# Patient Record
Sex: MALE | Race: WHITE | NOT HISPANIC OR LATINO | Employment: UNEMPLOYED
[De-identification: names, ages, dates, MRNs, and addresses within clinical notes are randomized per-mention and may not be internally consistent; named-entity substitution may affect disease eponyms.]

---

## 2017-09-19 PROBLEM — F32.9 MAJOR DEPRESSIVE DISORDER: Status: ACTIVE | Noted: 2017-09-19

## 2017-09-19 PROBLEM — F41.9 ANXIETY: Status: ACTIVE | Noted: 2017-09-19

## 2018-09-11 ENCOUNTER — HOSPITAL (OUTPATIENT)
Dept: OTHER | Age: 38
End: 2018-09-11
Attending: HOSPITALIST

## 2018-09-11 LAB
25(OH)D3+25(OH)D2 SERPL-MCNC: 31.7 NG/ML (ref 30–100)
ALBUMIN SERPL-MCNC: 4.4 GM/DL (ref 3.6–5.1)
ALBUMIN SERPL-MCNC: 6.7 GM/DL (ref 3.6–5.1)
ALBUMIN/GLOB SERPL: 1.4 {RATIO} (ref 1–2.4)
ALBUMIN/GLOB SERPL: 1.6 {RATIO} (ref 1–2.4)
ALP SERPL-CCNC: 49 UNIT/L (ref 45–117)
ALP SERPL-CCNC: 75 UNIT/L (ref 45–117)
ALT SERPL-CCNC: 41 UNIT/L
ALT SERPL-CCNC: 58 UNIT/L
AMORPH SED URNS QL MICRO: ABNORMAL
AMPHETAMINES UR QL SCN>500 NG/ML: NEGATIVE
ANALYZER ANC (IANC): ABNORMAL
ANALYZER ANC (IANC): ABNORMAL
ANION GAP SERPL CALC-SCNC: 21 MMOL/L (ref 10–20)
ANION GAP SERPL CALC-SCNC: 27 MMOL/L (ref 10–20)
APAP SERPL-MCNC: <2 MCG/ML (ref 10–30)
APPEARANCE UR: ABNORMAL
APTT PPP: 29 SECONDS (ref 22–30)
APTT PPP: NORMAL S
AST SERPL-CCNC: 32 UNIT/L
AST SERPL-CCNC: 47 UNIT/L
BACTERIA #/AREA URNS HPF: ABNORMAL /HPF
BARBITURATES UR QL SCN>200 NG/ML: NEGATIVE
BASE DEFICIT BLDA-SCNC: 7 MMOL/L (ref 0–2)
BASE EXCESS BLDA CALC-SCNC: ABNORMAL MMOL/L
BASOPHILS # BLD: 0 THOUSAND/MCL (ref 0–0.3)
BASOPHILS # BLD: 0 THOUSAND/MCL (ref 0–0.3)
BASOPHILS NFR BLD: 0 %
BASOPHILS NFR BLD: 0 %
BDY SITE: ABNORMAL
BENZODIAZ UR QL SCN>200 NG/ML: NEGATIVE
BILIRUB SERPL-MCNC: 0.6 MG/DL (ref 0.2–1)
BILIRUB SERPL-MCNC: 1 MG/DL (ref 0.2–1)
BILIRUB UR QL: NEGATIVE
BODY TEMPERATURE: 37 DEGREES
BUN SERPL-MCNC: 34 MG/DL (ref 6–20)
BUN SERPL-MCNC: 36 MG/DL (ref 6–20)
BUN/CREAT SERPL: 14 (ref 7–25)
BUN/CREAT SERPL: 9 (ref 7–25)
BZE UR QL SCN>150 NG/ML: NEGATIVE
CA-I BLDA-SCNC: 23 % (ref 15–23)
CALCIUM SERPL-MCNC: 11.7 MG/DL (ref 8.4–10.2)
CALCIUM SERPL-MCNC: 8.5 MG/DL (ref 8.4–10.2)
CANNABINOIDS UR QL SCN>50 NG/ML: POSITIVE
CAOX CRY URNS QL MICRO: ABNORMAL
CHLORIDE: 102 MMOL/L (ref 98–107)
CHLORIDE: 92 MMOL/L (ref 98–107)
CK SERPL-CCNC: 834 UNIT/L (ref 39–308)
CO2 SERPL-SCNC: 18 MMOL/L (ref 21–32)
CO2 SERPL-SCNC: 19 MMOL/L (ref 21–32)
COHGB MFR BLD: 1 %
COLOR UR: ABNORMAL
CONDITION: ABNORMAL
CONDITION: ABNORMAL
CREAT SERPL-MCNC: 2.5 MG/DL (ref 0.67–1.17)
CREAT SERPL-MCNC: 4.03 MG/DL (ref 0.67–1.17)
DIFFERENTIAL METHOD BLD: ABNORMAL
DIFFERENTIAL METHOD BLD: ABNORMAL
EOSINOPHIL # BLD: 0 THOUSAND/MCL (ref 0.1–0.5)
EOSINOPHIL # BLD: 0 THOUSAND/MCL (ref 0.1–0.5)
EOSINOPHIL NFR BLD: 0 %
EOSINOPHIL NFR BLD: 0 %
EPITH CASTS #/AREA URNS LPF: ABNORMAL /[LPF]
ERYTHROCYTE [DISTWIDTH] IN BLOOD: 12.1 % (ref 11–15)
ERYTHROCYTE [DISTWIDTH] IN BLOOD: 12.2 % (ref 11–15)
ETHANOL SERPL-MCNC: NORMAL MG/DL
FATTY CASTS #/AREA URNS LPF: ABNORMAL /[LPF]
GLOBULIN SER-MCNC: 3.2 GM/DL (ref 2–4)
GLOBULIN SER-MCNC: 4.2 GM/DL (ref 2–4)
GLUCOSE BLDC GLUCOMTR-MCNC: 74 MG/DL (ref 65–99)
GLUCOSE SERPL-MCNC: 111 MG/DL (ref 65–99)
GLUCOSE SERPL-MCNC: 121 MG/DL (ref 65–99)
GLUCOSE UR-MCNC: 150 MG/DL
GRAN CASTS #/AREA URNS LPF: ABNORMAL /[LPF]
HCO3 BLDA-SCNC: 18 MMOL/L (ref 22–28)
HCT VFR BLD CALC: 51 % (ref 39–51)
HEMATOCRIT: 43.1 % (ref 39–51)
HEMATOCRIT: 52.6 % (ref 39–51)
HGB BLD-MCNC: 15.2 GM/DL (ref 13–17)
HGB BLD-MCNC: 17.1 GM/DL (ref 13–17)
HGB BLD-MCNC: 19.2 GM/DL (ref 13–17)
HGB UR QL: ABNORMAL
HOROWITZ INDEX BLD+IHG-RTO: ABNORMAL MM[HG]
HYALINE CASTS #/AREA URNS LPF: ABNORMAL /LPF (ref 0–5)
IGA SERPL-MCNC: 364 MG/DL (ref 82–453)
IGG SERPL-MCNC: 1080 MG/DL (ref 751–1560)
IGM SERPL-MCNC: 81 MG/DL (ref 46–304)
INR PPP: 1.1
KAPPA LC FREE SER NEPH-MCNC: 1.27 MG/DL (ref 0.33–1.94)
KAPPA LC/LAMBDA SER: 0.93 {RATIO} (ref 0.26–1.65)
KETONES UR-MCNC: NEGATIVE MG/DL
LACTATE BLDV-MCNC: 1.1 MMOL/L
LACTATE BLDV-MCNC: 2 MMOL/L
LAMBDA LC FREE SER NEPH-MCNC: 1.36 MG/DL (ref 0.57–2.63)
LEUKOCYTE ESTERASE UR QL STRIP: NEGATIVE
LIPASE SERPL-CCNC: 161 UNIT/L (ref 73–393)
LYMPHOCYTES # BLD: 1.5 THOUSAND/MCL (ref 1–4.8)
LYMPHOCYTES # BLD: 2 THOUSAND/MCL (ref 1–4.8)
LYMPHOCYTES NFR BLD: 10 %
LYMPHOCYTES NFR BLD: 12 %
M PROTEIN 1 SERPL ELPH-MCNC: NORMAL GM/DL
M PROTEIN 2 SERPL ELPH-MCNC: NORMAL GM/DL
MAGNESIUM SERPL-MCNC: 2.6 MG/DL (ref 1.7–2.4)
MCH RBC QN AUTO: 32.6 PG (ref 26–34)
MCH RBC QN AUTO: 33.4 PG (ref 26–34)
MCHC RBC AUTO-ENTMCNC: 35.3 GM/DL (ref 32–36.5)
MCHC RBC AUTO-ENTMCNC: 36.5 GM/DL (ref 32–36.5)
MCV RBC AUTO: 91.6 FL (ref 78–100)
MCV RBC AUTO: 92.5 FL (ref 78–100)
METHGB MFR BLD: 0.7 %
MIXED CELL CASTS #/AREA URNS LPF: ABNORMAL /[LPF]
MONOCYTES # BLD: 1.1 THOUSAND/MCL (ref 0.3–0.9)
MONOCYTES # BLD: 1.5 THOUSAND/MCL (ref 0.3–0.9)
MONOCYTES NFR BLD: 8 %
MONOCYTES NFR BLD: 8 %
MUCOUS THREADS URNS QL MICRO: PRESENT
NEUTROPHILS # BLD: 10.7 THOUSAND/MCL (ref 1.8–7.7)
NEUTROPHILS # BLD: 15.6 THOUSAND/MCL (ref 1.8–7.7)
NEUTROPHILS NFR BLD: 80 %
NEUTROPHILS NFR BLD: 82 %
NEUTS SEG NFR BLD: ABNORMAL %
NEUTS SEG NFR BLD: ABNORMAL %
NITRITE UR QL: NEGATIVE
NRBC (NRBCRE): ABNORMAL
NRBC (NRBCRE): ABNORMAL
OPIATES UR QL SCN>300 NG/ML: NEGATIVE
ORGANIC ACIDS/CREAT UR-SRTO: 358.42 MG/DL
ORGANIC ACIDS/CREAT UR-SRTO: 358.42 MG/DL
OSMOLALITY SERPL: 296 MOSM/KG (ref 275–300)
OSMOLALITY UR: 364 MOSM/KG (ref 50–1200)
OXYHGB MFR BLD: 94.5 % (ref 94–98)
PAO2 / FIO2 RATIO (RPFR): ABNORMAL
PATH INTERP BLD-IMP: NORMAL
PATH INTERP SPEC-IMP: NORMAL
PCO2 BLDA: 31 MM HG (ref 35–48)
PCP UR QL SCN>25 NG/ML: NEGATIVE
PH BLDA: 7.36 UNIT (ref 7.35–7.45)
PH UR: 5 UNIT (ref 5–7)
PHOSPHATE SERPL-MCNC: 2.8 MG/DL (ref 2.4–4.7)
PLATELET # BLD: 285 THOUSAND/MCL (ref 140–450)
PLATELET # BLD: 381 THOUSAND/MCL (ref 140–450)
PO2 BLDA: 84 MM HG (ref 83–108)
POTASSIUM SERPL-SCNC: 4.1 MMOL/L (ref 3.4–5.1)
POTASSIUM SERPL-SCNC: 5.4 MMOL/L (ref 3.4–5.1)
PROT SERPL-MCNC: 10.9 GM/DL (ref 6.4–8.2)
PROT SERPL-MCNC: 7.6 GM/DL (ref 6.4–8.2)
PROT SERPL-MCNC: 7.9 GM/DL (ref 6.4–8.2)
PROT UR QL: 100 MG/DL
PROT UR-MCNC: 387 MG/DL
PROT/CREAT UR: 1080 MG/GM CREAT
PROTHROMBIN TIME: 11.1 SECONDS (ref 9.7–11.8)
PROTHROMBIN TIME: NORMAL
PTH-INTACT SERPL-MCNC: 22 PG/ML (ref 19–88)
RBC # BLD: 4.66 MILLION/MCL (ref 4.5–5.9)
RBC # BLD: 5.74 MILLION/MCL (ref 4.5–5.9)
RBC #/AREA URNS HPF: ABNORMAL /HPF (ref 0–3)
RBC CASTS #/AREA URNS LPF: ABNORMAL /[LPF]
RENAL EPI CELLS #/AREA URNS HPF: ABNORMAL /[HPF]
SALICYLATES SERPL-MCNC: <2.8 MG/DL
SAO2 % BLDA: 96 % (ref 95–99)
SODIUM SERPL-SCNC: 132 MMOL/L (ref 135–145)
SODIUM SERPL-SCNC: 138 MMOL/L (ref 135–145)
SODIUM UR-SCNC: 7 MMOL/L
SP GR UR: 1.01 (ref 1–1.03)
SPECIMEN SOURCE: ABNORMAL
SPERM URNS QL MICRO: PRESENT
SQUAMOUS #/AREA URNS HPF: ABNORMAL /HPF (ref 0–5)
T VAGINALIS URNS QL MICRO: ABNORMAL
TRI-PHOS CRY URNS QL MICRO: ABNORMAL
TROPONIN I SERPL HS-MCNC: <0.02 NG/ML
URATE CRY URNS QL MICRO: ABNORMAL
URINE REFLEX: ABNORMAL
URNS CMNT MICRO: ABNORMAL
UROBILINOGEN UR QL: 0.2 MG/DL (ref 0–1)
WAXY CASTS #/AREA URNS LPF: ABNORMAL /[LPF]
WBC # BLD: 13.4 THOUSAND/MCL (ref 4.2–11)
WBC # BLD: 19.1 THOUSAND/MCL (ref 4.2–11)
WBC #/AREA URNS HPF: ABNORMAL /HPF (ref 0–5)
WBC CASTS #/AREA URNS LPF: ABNORMAL /[LPF]
YEAST HYPHAE URNS QL MICRO: ABNORMAL
YEAST URNS QL MICRO: ABNORMAL

## 2018-09-12 ENCOUNTER — DIAGNOSTIC TRANS (OUTPATIENT)
Dept: OTHER | Age: 38
End: 2018-09-12

## 2018-09-12 LAB
ALBUMIN SERPL-MCNC: 4 GM/DL (ref 3.6–5.1)
ALBUMIN/GLOB SERPL: 1.4 {RATIO} (ref 1–2.4)
ALP SERPL-CCNC: 53 UNIT/L (ref 45–117)
ALT SERPL-CCNC: 38 UNIT/L
ANALYZER ANC (IANC): ABNORMAL
ANION GAP SERPL CALC-SCNC: 14 MMOL/L (ref 10–20)
AST SERPL-CCNC: 27 UNIT/L
BASOPHILS # BLD: 0 THOUSAND/MCL (ref 0–0.3)
BASOPHILS NFR BLD: 0 %
BILIRUB SERPL-MCNC: 0.4 MG/DL (ref 0.2–1)
BUN SERPL-MCNC: 33 MG/DL (ref 6–20)
BUN/CREAT SERPL: 17 (ref 7–25)
CALCIUM SERPL-MCNC: 8.5 MG/DL (ref 8.4–10.2)
CHLORIDE: 102 MMOL/L (ref 98–107)
CO2 SERPL-SCNC: 24 MMOL/L (ref 21–32)
CREAT SERPL-MCNC: 1.92 MG/DL (ref 0.67–1.17)
DIFFERENTIAL METHOD BLD: ABNORMAL
EOSINOPHIL # BLD: 0.1 THOUSAND/MCL (ref 0.1–0.5)
EOSINOPHIL NFR BLD: 1 %
ERYTHROCYTE [DISTWIDTH] IN BLOOD: 12.4 % (ref 11–15)
GLOBULIN SER-MCNC: 2.8 GM/DL (ref 2–4)
GLUCOSE SERPL-MCNC: 101 MG/DL (ref 65–99)
HEMATOCRIT: 40.6 % (ref 39–51)
HGB BLD-MCNC: 13.8 GM/DL (ref 13–17)
LYMPHOCYTES # BLD: 2.9 THOUSAND/MCL (ref 1–4.8)
LYMPHOCYTES NFR BLD: 31 %
MCH RBC QN AUTO: 31.7 PG (ref 26–34)
MCHC RBC AUTO-ENTMCNC: 34 GM/DL (ref 32–36.5)
MCV RBC AUTO: 93.1 FL (ref 78–100)
MONOCYTES # BLD: 1.1 THOUSAND/MCL (ref 0.3–0.9)
MONOCYTES NFR BLD: 12 %
NEUTROPHILS # BLD: 5.4 THOUSAND/MCL (ref 1.8–7.7)
NEUTROPHILS NFR BLD: 56 %
NEUTS SEG NFR BLD: ABNORMAL %
NRBC (NRBCRE): ABNORMAL
PLATELET # BLD: 279 THOUSAND/MCL (ref 140–450)
POTASSIUM SERPL-SCNC: 3.7 MMOL/L (ref 3.4–5.1)
PROT SERPL-MCNC: 6.8 GM/DL (ref 6.4–8.2)
RBC # BLD: 4.36 MILLION/MCL (ref 4.5–5.9)
SODIUM SERPL-SCNC: 136 MMOL/L (ref 135–145)
WBC # BLD: 9.6 THOUSAND/MCL (ref 4.2–11)

## 2018-09-13 LAB
ANION GAP SERPL CALC-SCNC: 9 MMOL/L (ref 10–20)
BUN SERPL-MCNC: 16 MG/DL (ref 6–20)
BUN/CREAT SERPL: 17 (ref 7–25)
CALCIUM SERPL-MCNC: 8.3 MG/DL (ref 8.4–10.2)
CHLORIDE: 105 MMOL/L (ref 98–107)
CO2 SERPL-SCNC: 28 MMOL/L (ref 21–32)
CREAT SERPL-MCNC: 0.92 MG/DL (ref 0.67–1.17)
GLUCOSE SERPL-MCNC: 94 MG/DL (ref 65–99)
GLYCOHEMOGLOBIN: 5.7 % (ref 4.5–5.6)
POTASSIUM SERPL-SCNC: 4.4 MMOL/L (ref 3.4–5.1)
SODIUM SERPL-SCNC: 138 MMOL/L (ref 135–145)
TSH SERPL-ACNC: 1.15 MCUNIT/ML (ref 0.35–5)

## 2018-09-21 PROCEDURE — 81003 URINALYSIS AUTO W/O SCOPE: CPT | Performed by: INTERNAL MEDICINE

## 2019-01-04 PROBLEM — M10.071 ACUTE IDIOPATHIC GOUT INVOLVING TOE OF RIGHT FOOT: Status: ACTIVE | Noted: 2019-01-04

## 2019-11-26 PROBLEM — M1A.09X0 IDIOPATHIC CHRONIC GOUT OF MULTIPLE SITES WITHOUT TOPHUS: Status: ACTIVE | Noted: 2019-11-26

## 2020-01-21 ENCOUNTER — WALK IN (OUTPATIENT)
Dept: URGENT CARE | Age: 40
End: 2020-01-21

## 2020-01-21 DIAGNOSIS — Z23 NEED FOR TDAP VACCINATION: Primary | ICD-10-CM

## 2020-01-21 PROCEDURE — 90715 TDAP VACCINE 7 YRS/> IM: CPT | Performed by: NURSE PRACTITIONER

## 2020-02-03 ENCOUNTER — HOSPITAL ENCOUNTER (OUTPATIENT)
Dept: GENERAL RADIOLOGY | Facility: HOSPITAL | Age: 40
Discharge: HOME | End: 2020-02-03
Attending: INTERNAL MEDICINE
Payer: MEDICAID

## 2020-02-03 ENCOUNTER — TELEPHONE (OUTPATIENT)
Dept: RHEUMATOLOGY | Facility: CLINIC | Age: 40
End: 2020-02-03

## 2020-02-03 ENCOUNTER — OFFICE VISIT (OUTPATIENT)
Dept: RHEUMATOLOGY | Facility: CLINIC | Age: 40
End: 2020-02-03
Payer: MEDICAID

## 2020-02-03 ENCOUNTER — APPOINTMENT (OUTPATIENT)
Dept: LAB | Facility: HOSPITAL | Age: 40
End: 2020-02-03
Attending: INTERNAL MEDICINE
Payer: MEDICAID

## 2020-02-03 VITALS
SYSTOLIC BLOOD PRESSURE: 125 MMHG | DIASTOLIC BLOOD PRESSURE: 80 MMHG | WEIGHT: 215 LBS | BODY MASS INDEX: 28.49 KG/M2 | HEART RATE: 73 BPM | HEIGHT: 73 IN

## 2020-02-03 DIAGNOSIS — M54.50 CHRONIC BILATERAL LOW BACK PAIN WITHOUT SCIATICA: ICD-10-CM

## 2020-02-03 DIAGNOSIS — M79.10 MYALGIA: ICD-10-CM

## 2020-02-03 DIAGNOSIS — G89.29 CHRONIC BILATERAL LOW BACK PAIN WITHOUT SCIATICA: ICD-10-CM

## 2020-02-03 DIAGNOSIS — M25.50 POLYARTHRALGIA: ICD-10-CM

## 2020-02-03 DIAGNOSIS — R53.83 FATIGUE, UNSPECIFIED TYPE: ICD-10-CM

## 2020-02-03 DIAGNOSIS — M25.50 POLYARTHRALGIA: Primary | ICD-10-CM

## 2020-02-03 LAB
CK SERPL-CCNC: 286 U/L (ref 39–308)
CRP SERPL-MCNC: 1.02 MG/DL (ref ?–0.3)
ERYTHROCYTE [SEDIMENTATION RATE] IN BLOOD: 14 MM/HR (ref 0–15)
HCV AB SERPL QL IA: NONREACTIVE
RHEUMATOID FACT SERPL-ACNC: <10 IU/ML (ref ?–15)

## 2020-02-03 PROCEDURE — 82550 ASSAY OF CK (CPK): CPT

## 2020-02-03 PROCEDURE — 72202 X-RAY EXAM SI JOINTS 3/> VWS: CPT | Performed by: INTERNAL MEDICINE

## 2020-02-03 PROCEDURE — 85652 RBC SED RATE AUTOMATED: CPT

## 2020-02-03 PROCEDURE — 86803 HEPATITIS C AB TEST: CPT

## 2020-02-03 PROCEDURE — 86140 C-REACTIVE PROTEIN: CPT

## 2020-02-03 PROCEDURE — 84402 ASSAY OF FREE TESTOSTERONE: CPT

## 2020-02-03 PROCEDURE — 84403 ASSAY OF TOTAL TESTOSTERONE: CPT

## 2020-02-03 PROCEDURE — 86200 CCP ANTIBODY: CPT

## 2020-02-03 PROCEDURE — 72100 X-RAY EXAM L-S SPINE 2/3 VWS: CPT | Performed by: INTERNAL MEDICINE

## 2020-02-03 PROCEDURE — 36415 COLL VENOUS BLD VENIPUNCTURE: CPT

## 2020-02-03 PROCEDURE — 86038 ANTINUCLEAR ANTIBODIES: CPT

## 2020-02-03 PROCEDURE — 86812 HLA TYPING A B OR C: CPT

## 2020-02-03 PROCEDURE — 86431 RHEUMATOID FACTOR QUANT: CPT

## 2020-02-03 PROCEDURE — 82085 ASSAY OF ALDOLASE: CPT

## 2020-02-03 PROCEDURE — 99244 OFF/OP CNSLTJ NEW/EST MOD 40: CPT | Performed by: INTERNAL MEDICINE

## 2020-02-03 RX ORDER — MELOXICAM 15 MG/1
15 TABLET ORAL DAILY
Qty: 30 TABLET | Refills: 0 | Status: SHIPPED | OUTPATIENT
Start: 2020-02-03 | End: 2020-03-02

## 2020-02-03 NOTE — PATIENT INSTRUCTIONS
1. Check labs   2. Check si joint xray and lumbar xray   3. Return to clinic in 2 weeks.    4. Cont. alloupurinol 100mga day

## 2020-02-03 NOTE — TELEPHONE ENCOUNTER
Patient is requesting a 2 week follow up appointment with Dr. Mary Richards at the [facility]. Next available appointment after 2 weeks is not until 03/02/2020, however, patient wants to be seen sooner. Please advise.

## 2020-02-03 NOTE — PROGRESS NOTES
Olvin Sorto is a 46year old male who presents for Patient presents with:  Consult  Joint Pain: back, neck,knees - evaluation for gout  Medication Question: Took prednisone in the past  .   HPI:     I had the pleasure of seeing Olvin Sorto on in pain all day every day. For 15 years his back has been hurting. He had an xray years ago. He thought it was from work. Never had physical therapy for his back. Never had injection in his back. He has no relieft with sitting and laying down.  Re Surgical History:   Procedure Laterality Date   • LASIK     • OTHER SURGICAL HISTORY      shoulder right repair   • SHOULDER ARTHROSCOPY        History reviewed. No pertinent family history. 1 brother , no fh of psoraisis.    Grandmother, 2 uncles and a rashes  CVS: RRR, no murmurs  RS: CTAB, no crackles, no rhonchi  ABD: Soft Non tender, no HSM felt, BS positive  Joint exam: achy in his neck and mid back and lower back   B/l knee not tender, but crepiuts b/l no synvoitis.    Not tender to touch in shoulde 1. 27   TSH      0.350 - 5.500 mIU/L 2.154     ASSESSMENT AND PLAN:   Cristian Fuentes is a 46year old male who presents for Patient presents with:  Consult  Joint Pain: back, neck,knees - evaluation for gout  Medication Question: Took prednisone in the

## 2020-02-04 LAB
ALDOLASE, SERUM: 6.2 U/L
HLA-B27: POSITIVE

## 2020-02-04 NOTE — TELEPHONE ENCOUNTER
Pt contacted and given follow up for 2/10/20 at 3:50 pm at [facility]. Lab contacted to check if labs in process will be complete by scheduled appointment; per lab they will be completed before appointment next Monday.

## 2020-02-04 NOTE — TELEPHONE ENCOUNTER
Ok to break up 2/26 slot - for follow up s, but I feel like we can put him on wait list and call if anyone drops off can have him schedule

## 2020-02-05 LAB — CCP IGG SERPL-ACNC: <0.4 U/ML (ref 0–6.9)

## 2020-02-06 LAB
NUCLEAR IGG TITR SER IF: NEGATIVE {TITER}
TESTOSTERONE, FREE, S: 111 NG/DL
TESTOSTERONE, TOTAL, S: 2850 NG/DL

## 2020-02-10 ENCOUNTER — OFFICE VISIT (OUTPATIENT)
Dept: RHEUMATOLOGY | Facility: CLINIC | Age: 40
End: 2020-02-10
Payer: MEDICAID

## 2020-02-10 VITALS
RESPIRATION RATE: 16 BRPM | SYSTOLIC BLOOD PRESSURE: 112 MMHG | DIASTOLIC BLOOD PRESSURE: 69 MMHG | BODY MASS INDEX: 28.49 KG/M2 | HEART RATE: 76 BPM | HEIGHT: 73 IN | WEIGHT: 215 LBS

## 2020-02-10 DIAGNOSIS — M54.89 INFLAMMATORY BACK PAIN: ICD-10-CM

## 2020-02-10 DIAGNOSIS — M54.50 CHRONIC BILATERAL LOW BACK PAIN WITHOUT SCIATICA: Primary | ICD-10-CM

## 2020-02-10 DIAGNOSIS — M79.10 MYALGIA: ICD-10-CM

## 2020-02-10 DIAGNOSIS — M25.50 POLYARTHRALGIA: ICD-10-CM

## 2020-02-10 DIAGNOSIS — G89.29 CHRONIC BILATERAL LOW BACK PAIN WITHOUT SCIATICA: Primary | ICD-10-CM

## 2020-02-10 PROCEDURE — 99214 OFFICE O/P EST MOD 30 MIN: CPT | Performed by: INTERNAL MEDICINE

## 2020-02-10 NOTE — PROGRESS NOTES
Megan Coker is a 46year old male who presents for Patient presents with: Follow - Up: Polyarthralgia, patient c/o pain of back, knees, neck and hands  Results  Medication Follow-Up  .    HPI:     I had the pleasure of seeing Megan Coker on 2 in pain all day every day. For 15 years his back has been hurting. He had an xray years ago. He thought it was from work. Never had physical therapy for his back. Never had injection in his back. He has no relieft with sitting and laying down.  Re Medication Sig Dispense Refill   • Meloxicam 15 MG Oral Tab Take 1 tablet (15 mg total) by mouth daily.  30 tablet 0   • ALLOPURINOL 100 MG Oral Tab TAKE 1 TABLET(100 MG) BY MOUTH DAILY 90 tablet 1   • OMEPRAZOLE 40 MG Oral Capsule Delayed Release TAKE ON stools,   : no dysuria, no hx of kidney stones, when he got dehyrated it would hurt to urinate, at [facility] he was told he migh thave kidney stones, never needed treatment,   Neuro: occl numbness or tingling, no headache, no hx of seizures,   Psych: no h Eosinophils %      % 4.3   Basophils %      % 0.3   Patient Fasting?        Yes   Glucose      70 - 99 mg/dL 95   BUN      8.0 - 20.0 mg/dL 11.0   CREATININE      0.70 - 1.30 mg/dL 0.99   Sodium      136 - 144 mmol/L 138   Potassium      3.60 - 5.10 mmol/ b/l knee xryas -   prednisoen may have helped him partially while he was on it. Plan for physical therapy    2. Right foot gout - stable on 1 year of allopurinol 100mg a day - in the past was on prednisone, colchicine and indocin    3.  Hx of dehyration a

## 2020-02-10 NOTE — PATIENT INSTRUCTIONS
1. Check MRI si joints - eval for ankylosing spondylitis   2. Check knees bilaterally -   3. Return to clinic in 2 weeks. 4. Cont. alloupurinol 100mga day   5. After MRI si joints - try sulfasalazine 500mg twice a day -   6. Cont. meloxicam 15mg a day . dose?  If you miss a dose, take it as soon as you can. If it is almost time for your next dose, take only that dose. Do not take double or extra doses. Where should I keep my medicine? Keep out of the reach of children.   Store at room temperature between and give this medicine. Use exactly as directed. Take your medicine at regular intervals. Do not take your medicine more often than directed. Carley Blizzard will be given to you by the pharmacist with each prescription and refill.  Be sure to read this medications:  · vaccines  What if I miss a dose? If you miss a dose, take it as soon as you can. If it is almost time for your next dose, take only that dose. Do not take double or extra doses. Give the next dose when your next scheduled dose is due.  Call this medicine. Do not treat yourself. This medicine may decrease your body's ability to fight infection. Talk to your doctor about your risk of cancer. You may be more at risk for certain types of cancers if you take this medicine.   NOTE:This sheet is a s

## 2020-02-14 ENCOUNTER — TELEPHONE (OUTPATIENT)
Dept: RHEUMATOLOGY | Facility: CLINIC | Age: 40
End: 2020-02-14

## 2020-02-14 NOTE — TELEPHONE ENCOUNTER
This number was not in service when tried calling twice - please send me another number to call for peer to peer -

## 2020-02-14 NOTE — TELEPHONE ENCOUNTER
Pt's ins has denied the MRI Pelvis. Spoke w/pt, and advised pt to call your office. If you would like a peer to peer, please call Vikki Davis @ [phone]. Thank you.

## 2020-02-17 NOTE — TELEPHONE ENCOUNTER
Referral tab updated. Contacted pt and updated of imaging PA approval. Pt is aware he may proceed with MRI as scheduled below.      Future Appointments   Date Time Provider Navya Petit   2/19/2020  4:00 PM [facility] MRI RM1 (1.5T WIDE) [facility] MRI EM [place]

## 2020-02-17 NOTE — TELEPHONE ENCOUNTER
[id number] review scheduled for today 2/17/2020 at 1pm CST with [address] [facility] reviewer Dr. Daren Pavon.     (Dr. Teena Arredondo direct cell # provided)

## 2020-02-29 ENCOUNTER — HOSPITAL ENCOUNTER (OUTPATIENT)
Dept: MRI IMAGING | Age: 40
Discharge: HOME | End: 2020-02-29
Attending: INTERNAL MEDICINE
Payer: MEDICAID

## 2020-02-29 ENCOUNTER — HOSPITAL ENCOUNTER (OUTPATIENT)
Dept: GENERAL RADIOLOGY | Age: 40
Discharge: HOME | End: 2020-02-29
Attending: INTERNAL MEDICINE
Payer: MEDICAID

## 2020-02-29 DIAGNOSIS — M25.50 PAIN IN JOINT, MULTIPLE SITES: ICD-10-CM

## 2020-02-29 DIAGNOSIS — M54.89 INFLAMMATORY BACK PAIN: ICD-10-CM

## 2020-02-29 DIAGNOSIS — G89.29 CHRONIC BILATERAL LOW BACK PAIN WITHOUT SCIATICA: ICD-10-CM

## 2020-02-29 DIAGNOSIS — M54.50 CHRONIC BILATERAL LOW BACK PAIN WITHOUT SCIATICA: ICD-10-CM

## 2020-02-29 PROCEDURE — 73562 X-RAY EXAM OF KNEE 3: CPT | Performed by: INTERNAL MEDICINE

## 2020-02-29 PROCEDURE — 72195 MRI PELVIS W/O DYE: CPT | Performed by: INTERNAL MEDICINE

## 2020-03-02 RX ORDER — MELOXICAM 15 MG/1
15 TABLET ORAL DAILY
Qty: 30 TABLET | Refills: 0 | Status: SHIPPED | OUTPATIENT
Start: 2020-03-02 | End: 2020-04-01

## 2020-03-02 NOTE — TELEPHONE ENCOUNTER
LOV: 2/21/2020  Future Appointments   Date Time Provider Navya Guillaumei   3/10/2020  4:10 PM Cynthia George MD [facility]   3/12/2020 12:20 PM Kristyn Thurman MD [facility] [place]   3/13/2020 10:40 AM Favian Hendricks MD [facility] N[facility]   3/1

## 2020-03-03 ENCOUNTER — TELEPHONE (OUTPATIENT)
Dept: RHEUMATOLOGY | Facility: CLINIC | Age: 40
End: 2020-03-03

## 2020-03-05 PROBLEM — H93.13 TINNITUS OF BOTH EARS: Status: ACTIVE | Noted: 2020-03-05

## 2020-03-12 ENCOUNTER — OFFICE VISIT (OUTPATIENT)
Dept: RHEUMATOLOGY | Facility: CLINIC | Age: 40
End: 2020-03-12
Payer: MEDICAID

## 2020-03-12 ENCOUNTER — TELEPHONE (OUTPATIENT)
Dept: RHEUMATOLOGY | Facility: CLINIC | Age: 40
End: 2020-03-12

## 2020-03-12 VITALS
WEIGHT: 219 LBS | HEART RATE: 71 BPM | SYSTOLIC BLOOD PRESSURE: 118 MMHG | HEIGHT: 73 IN | RESPIRATION RATE: 16 BRPM | DIASTOLIC BLOOD PRESSURE: 72 MMHG | BODY MASS INDEX: 29.03 KG/M2

## 2020-03-12 DIAGNOSIS — M45.9 ANKYLOSING SPONDYLITIS, UNSPECIFIED SITE OF SPINE (HCC): Primary | ICD-10-CM

## 2020-03-12 DIAGNOSIS — Z51.81 THERAPEUTIC DRUG MONITORING: ICD-10-CM

## 2020-03-12 PROCEDURE — 99214 OFFICE O/P EST MOD 30 MIN: CPT | Performed by: INTERNAL MEDICINE

## 2020-03-12 RX ORDER — SULFASALAZINE 500 MG/1
500 TABLET ORAL 2 TIMES DAILY
Qty: 60 TABLET | Refills: 1 | Status: SHIPPED | OUTPATIENT
Start: 2020-03-12 | End: 2020-04-01 | Stop reason: DRUGHIGH

## 2020-03-12 RX ORDER — HYDROCODONE BITARTRATE AND ACETAMINOPHEN 5; 325 MG/1; MG/1
TABLET ORAL
COMMUNITY
Start: 2020-02-17 | End: 2021-02-04 | Stop reason: ALTCHOICE

## 2020-03-12 RX ORDER — CYCLOBENZAPRINE HCL 5 MG
TABLET ORAL
Qty: 60 TABLET | Refills: 1 | Status: SHIPPED | OUTPATIENT
Start: 2020-03-12 | End: 2020-05-26

## 2020-03-12 RX ORDER — PREDNISONE 10 MG/1
TABLET ORAL
Qty: 21 TABLET | Refills: 0 | Status: SHIPPED | OUTPATIENT
Start: 2020-03-12 | End: 2020-09-18

## 2020-03-12 NOTE — PATIENT INSTRUCTIONS
1. Cont meloxicam 15mg a day - take with food   2. Plan for vnsoqi6z 40mg every other week    3. Cont. alloupurinol 100mga day   4. Plan to start humira after 1 month - to reduce risk of corona virus -   6. Try sulfalazine 500mg twice a day -   7.  Try pred continue or are bothersome):  · headache  · loss of appetite  · nausea, vomiting  · orange color to the urine  · reduced sperm count  What may interact with this medicine? · digoxin  · folic acid    What if I miss a dose?   If you miss a dose, take it as s

## 2020-03-12 NOTE — PROGRESS NOTES
Jesica Montes is a 46year old male who presents for Patient presents with:  Results: MRI  . HPI:     I had the pleasure of seeing Jescia Montes on 2/3/2020 for evaluation. He is a pleasant 46year old who was referred by Dr. Kevon Ontiveros.    He arriola He thought it was from work. Never had physical therapy for his back. Never had injection in his back. He has no relieft with sitting and laying down. Resting doesn't help. He has hurt his shoulder in the past at work. Had injections for that. prednisone for 8 months 2 years ago for gout  and then was dehydated severely . Wt Readings from Last 2 Encounters:  03/12/20 : 219 lb (99.3 kg)  02/21/20 : 219 lb (99.3 kg)    Body mass index is 28.89 kg/m².       Current Outpatient Medications   M jaw pain, no temple pain  Eyes: No visual changes,   CVS: No chest pain, no heart disease  RS: No SOB, no Cough, No Pleurtic pain,   GI: No nausea, no vomiiting, no abominal pain, no hx of ulcer, GERD - on omeprazoel since age 14,  heartburn, no dyshpagia, Eosinophils Absolute      0.00 - 0.30 10ˆ3/µL 0.27   Basophils Absolute      0.00 - 0.10 10ˆ3/µL 0.02   Neutrophils %      % 57.4   Lymphocytes %      % 27.0   Monocytes %      % 11.0   Eosinophils %      % 4.3   Basophils %      % 0.3   Patient Fasting? roots.     3. No acute osseous injury of the sacrococcygeal spine is apparent. 4. No suspicious osseous lesions are detected. 5. Lesser incidental findings as above.        2/29/2020 - b/l knee xray   normal  ASSESSMENT AND PLAN:   Meliton Collazo then off  8. Return to clinic in 3-4 weeks.        Luis Alejo MD  2/10/2020   4:28 PM  - [place]ed [place]- [place]  through Epic

## 2020-03-16 NOTE — TELEPHONE ENCOUNTER
Active 3/13/2020 Kiya [place] 7/31/1980 Humira CF Pending PA pending, we will submit once TB test reults are in. None.  [facility]

## 2020-03-25 NOTE — TELEPHONE ENCOUNTER
Active 3/13/2020 Hasmukh Rough 7/31/1980 Caitlin ESCOTO Pending PA pending, we will submit once TB test reults are in. None.  [facility]

## 2020-03-30 NOTE — TELEPHONE ENCOUNTER
Active 3/13/2020 Cami Guaman 7/31/1980 Humira CF Pending PA pending, we will submit once TB test reults are in. None.  [facility]

## 2020-04-01 ENCOUNTER — TELEPHONE (OUTPATIENT)
Dept: RHEUMATOLOGY | Facility: CLINIC | Age: 40
End: 2020-04-01

## 2020-04-01 ENCOUNTER — VIRTUAL PHONE E/M (OUTPATIENT)
Dept: RHEUMATOLOGY | Facility: CLINIC | Age: 40
End: 2020-04-01
Payer: MEDICAID

## 2020-04-01 DIAGNOSIS — Z51.81 THERAPEUTIC DRUG MONITORING: ICD-10-CM

## 2020-04-01 DIAGNOSIS — M45.9 ANKYLOSING SPONDYLITIS, UNSPECIFIED SITE OF SPINE (HCC): Primary | ICD-10-CM

## 2020-04-01 PROCEDURE — 99213 OFFICE O/P EST LOW 20 MIN: CPT | Performed by: INTERNAL MEDICINE

## 2020-04-01 RX ORDER — MELOXICAM 15 MG/1
15 TABLET ORAL DAILY
Qty: 90 TABLET | Refills: 0 | Status: SHIPPED | OUTPATIENT
Start: 2020-04-01 | End: 2020-06-26

## 2020-04-01 RX ORDER — SULFASALAZINE 500 MG/1
500 TABLET ORAL 3 TIMES DAILY
Qty: 270 TABLET | Refills: 0 | Status: SHIPPED | OUTPATIENT
Start: 2020-04-01 | End: 2020-04-06

## 2020-04-01 NOTE — TELEPHONE ENCOUNTER
Requested Prescriptions     Pending Prescriptions Disp Refills   • Meloxicam 15 MG Oral Tab 30 tablet 0     Sig: Take 1 tablet (15 mg total) by mouth daily.      Last filled:3/2/20 #30 tab w/ 0 rf   LOV: 3/12/20  Future Appointments   Date Time Provider Dep

## 2020-04-01 NOTE — PROGRESS NOTES
Virtual/Telephone Check-In    [facility] verbally consents to a Virtual/Telephone Check-In service on 04/01/20. Patient understands and accepts financial responsibility for any deductible, co-insurance and/or co-pays associated with this service. his knees and they area \"shot\". His back is insanely bad per pt. He can't hold his infant baby. He feels a burning sensation in his back with any motion - waist line to his mid back. He has had back issues for the last 15 years.    He is in pain all d knees, he has pain. He feels crunching of his knee caps. 3/12/2020  He has back back pain - 5/10 pain now  He gest sciatic a pain left more than right last month especially.    His mri si joint - shows left side mild edema of si joint   B/lknee xray - triamcinolone acetonide 0.1 % External Cream Apply topically 2 (two) times daily as needed.  (Patient not taking: Reported on 3/12/2020 ) 30 g 0   • ALLOPURINOL 100 MG Oral Tab TAKE 1 TABLET(100 MG) BY MOUTH DAILY 90 tablet 1   • OMEPRAZOLE 40 MG Oral Capsu loose stools,   : no dysuria, no hx of kidney stones, when he got dehyrated it would hurt to urinate, at [facility] he was told he migh thave kidney stones, never needed treatment,   Neuro: occl numbness or tingling, no headache, no hx of seizures,   Psych - 4 ng/mL 1.27   TSH      0.350 - 5.500 mIU/L 2.154     Component      Latest Ref Rng & Units 2/3/2020   Testosterone, Free, S      4.65 - 18.1 ng/dL 111 (H)   Testosterone, Total, S      240 - 950 ng/dL 2850 (H)   ALDOLASE, SERUM      1.5 - 8.1 U/L 6.2 - 6/4 at 2:50pm     prednisoen may have helped him partially while he was on it. Plan for physical therapy      2. Right foot gout - stable on 1 year of allopurinol 100mg a day - in the past was on prednisone, colchicine and indocin    3.  Hx of dehyratio

## 2020-04-06 ENCOUNTER — TELEPHONE (OUTPATIENT)
Dept: RHEUMATOLOGY | Facility: CLINIC | Age: 40
End: 2020-04-06

## 2020-04-06 DIAGNOSIS — Z51.81 THERAPEUTIC DRUG MONITORING: ICD-10-CM

## 2020-04-06 DIAGNOSIS — M45.9 ANKYLOSING SPONDYLITIS, UNSPECIFIED SITE OF SPINE (HCC): ICD-10-CM

## 2020-04-06 RX ORDER — SULFASALAZINE 500 MG/1
500 TABLET ORAL 3 TIMES DAILY
Qty: 270 TABLET | Refills: 0 | OUTPATIENT
Start: 2020-04-06 | End: 2020-09-23

## 2020-04-06 NOTE — TELEPHONE ENCOUNTER
Unable to reach pt. Left message informing pt medication is on back order at pharmacy - is there another pharmacy he would like to try? Ext [phone] today.

## 2020-04-06 NOTE — TELEPHONE ENCOUNTER
Nothing can be used to replace at this time ,   Can he get this at a different pharmacy? But currently let them fill when it's available.

## 2020-04-06 NOTE — TELEPHONE ENCOUNTER
Receive fax today from [facility], notifying medication sulfaSALAzine 500 MG Oral Tab is on backorder can something else be sent? Please fax with approval along with strength, direction, quantity, and refills. Please advise.     Last refilled: 4/1/20 #270 ta

## 2020-04-06 NOTE — TELEPHONE ENCOUNTER
Patient called back, attempted to reach clinic no answer. Patient states there is a [address] at [address], [place], [address] [facility]. Phone number is [phone].

## 2020-04-06 NOTE — TELEPHONE ENCOUNTER
Sulfasalazine called into [facility] in [facility]. Verbal given to pharmacist Fara Brooks for sulfasalazine 500mg #270 tablets with 0 refills as approved by provider on 4/1/2020:        SSZ script cancelled at [place] (technician Dirk Queen). Pt notified.  HEIDI -

## 2020-04-09 ENCOUNTER — TELEPHONE (OUTPATIENT)
Dept: RHEUMATOLOGY | Facility: CLINIC | Age: 40
End: 2020-04-09

## 2020-04-09 NOTE — TELEPHONE ENCOUNTER
Patient would like to set up a telephone visit with Dr. Sb Brock. States they are going over a few things in regards to an autoimmune disease he has. He would like to bring up a few issues he is having.

## 2020-04-10 ENCOUNTER — VIRTUAL PHONE E/M (OUTPATIENT)
Dept: RHEUMATOLOGY | Facility: CLINIC | Age: 40
End: 2020-04-10
Payer: MEDICAID

## 2020-04-10 DIAGNOSIS — R06.00 DOE (DYSPNEA ON EXERTION): Primary | ICD-10-CM

## 2020-04-10 DIAGNOSIS — M45.9 ANKYLOSING SPONDYLITIS, UNSPECIFIED SITE OF SPINE (HCC): ICD-10-CM

## 2020-04-10 PROCEDURE — 99212 OFFICE O/P EST SF 10 MIN: CPT | Performed by: INTERNAL MEDICINE

## 2020-04-10 NOTE — PROGRESS NOTES
Virtual Telephone Check-In    Ketty Bowen verbally consents to  a Virtual/Telephone Check-In visit on 04/10/20. Patient understands and accepts financial responsibility for any deductible, co-insurance and/or co-pays associated with this service. his knees and they area \"shot\". His back is insanely bad per pt. He can't hold his infant baby. He feels a burning sensation in his back with any motion - waist line to his mid back. He has had back issues for the last 15 years.    He is in pain all d knees, he has pain. He feels crunching of his knee caps. 3/12/2020  He has back back pain - 5/10 pain now  He gest sciatic a pain left more than right last month especially.    His mri si joint - shows left side mild edema of si joint   B/lknee xray - Current Outpatient Medications   Medication Sig Dispense Refill   • sulfaSALAzine 500 MG Oral Tab Take 1 tablet (500 mg total) by mouth 3 (three) times daily.  With meals 270 tablet 0   • Meloxicam 15 MG Oral Tab Take 1 tablet (15 mg total) by mouth daily , 132 week old daugther. Not working right now.    Studying now -      REVIEW OF SYSTEMS:   Review Of Systems:  Fatigue  Constitutional:No fever, no change in weight or appetitie  Derm: No rashes, no oral ulcers, no alopecia, no photosensitivit Patient Fasting?        Yes   Glucose      70 - 99 mg/dL 95   BUN      8.0 - 20.0 mg/dL 11.0   CREATININE      0.70 - 1.30 mg/dL 0.99   Sodium      136 - 144 mmol/L 138   Potassium      3.60 - 5.10 mmol/L 4.10   Chloride      101 - 111 mmol/L 102   Carbon Mt Levine is a 46year old male who presents for No chief complaint on file. 1. Ankloysing spondylitis - HLAB27 positive  -inflammatory - back pain ,  Polyarthralgia/myalgia nad long standing hx of gout.    MRI si joints - shows mild edema i

## 2020-05-26 RX ORDER — CYCLOBENZAPRINE HCL 5 MG
TABLET ORAL
Qty: 60 TABLET | Refills: 1 | Status: SHIPPED | OUTPATIENT
Start: 2020-05-26 | End: 2020-08-15

## 2020-05-26 NOTE — TELEPHONE ENCOUNTER
LOV: 4/10/20  Last Refilled:#60, 1rf  3/12/20    Future Appointments   Date Time Provider Navya Petit   5/27/2020  2:30 PM Enoch Duran MD [facility] [place]   6/1/2020  8:15 AM [facility] PFT ROOM [facility] RT  [facility]   6/2/2020  2:00 PM Teresa Redding

## 2020-05-27 ENCOUNTER — APPOINTMENT (OUTPATIENT)
Dept: LAB | Facility: HOSPITAL | Age: 40
End: 2020-05-27
Attending: INTERNAL MEDICINE
Payer: MEDICAID

## 2020-05-27 ENCOUNTER — OFFICE VISIT (OUTPATIENT)
Dept: GASTROENTEROLOGY | Facility: CLINIC | Age: 40
End: 2020-05-27
Payer: MEDICAID

## 2020-05-27 VITALS
WEIGHT: 209.81 LBS | DIASTOLIC BLOOD PRESSURE: 79 MMHG | HEIGHT: 73 IN | HEART RATE: 82 BPM | SYSTOLIC BLOOD PRESSURE: 133 MMHG | BODY MASS INDEX: 27.81 KG/M2

## 2020-05-27 DIAGNOSIS — M45.9 ANKYLOSING SPONDYLITIS, UNSPECIFIED SITE OF SPINE (HCC): ICD-10-CM

## 2020-05-27 DIAGNOSIS — Z51.81 THERAPEUTIC DRUG MONITORING: ICD-10-CM

## 2020-05-27 DIAGNOSIS — K21.9 GASTROESOPHAGEAL REFLUX DISEASE, ESOPHAGITIS PRESENCE NOT SPECIFIED: Primary | ICD-10-CM

## 2020-05-27 PROCEDURE — 99214 OFFICE O/P EST MOD 30 MIN: CPT | Performed by: INTERNAL MEDICINE

## 2020-05-27 PROCEDURE — 86803 HEPATITIS C AB TEST: CPT

## 2020-05-27 PROCEDURE — 86140 C-REACTIVE PROTEIN: CPT

## 2020-05-27 PROCEDURE — 36415 COLL VENOUS BLD VENIPUNCTURE: CPT

## 2020-05-27 PROCEDURE — 86704 HEP B CORE ANTIBODY TOTAL: CPT

## 2020-05-27 PROCEDURE — 85652 RBC SED RATE AUTOMATED: CPT

## 2020-05-27 PROCEDURE — 85027 COMPLETE CBC AUTOMATED: CPT

## 2020-05-27 PROCEDURE — 86480 TB TEST CELL IMMUN MEASURE: CPT

## 2020-05-27 PROCEDURE — 84460 ALANINE AMINO (ALT) (SGPT): CPT

## 2020-05-27 PROCEDURE — 82565 ASSAY OF CREATININE: CPT

## 2020-05-27 PROCEDURE — 87340 HEPATITIS B SURFACE AG IA: CPT

## 2020-05-27 PROCEDURE — 84450 TRANSFERASE (AST) (SGOT): CPT

## 2020-05-28 NOTE — PROGRESS NOTES
HPI:    Patient ID: Tiffany Triana is a 46year old male. HPI  The patient is self-referred. He presents for an additional opinion regarding chronic gastroesophageal reflux.   He has recently seen Dr. Rupa Sanchez and is scheduled for an upper endosc • HYDROcodone-acetaminophen 5-325 MG Oral Tab TK 1 T PO QHS PRN     • ALLOPURINOL 100 MG Oral Tab TAKE 1 TABLET(100 MG) BY MOUTH DAILY 90 tablet 1   • OMEPRAZOLE 40 MG Oral Capsule Delayed Release TAKE ONE CAPSULE BY MOUTH ONE TIME DAILY 90 capsule 3   • Latest Ref Rng & Units 5/27/2020 2/3/2020 11/26/2019   Patient Fasting?          Yes   Glucose      70 - 99 mg/dL   95   BUN      8.0 - 20.0 mg/dL   11.0   CREATININE      0.70 - 1.30 mg/dL   0.99   Sodium      136 - 144 mmol/L   138   Potassium      3.60 - 3.5 - 4.8 g/dL 4.3 4.0   Total Bilirubin      0.10 - 2.00 mg/dL 0.37 0.4   ALKALINE PHOSPHATASE      45 - 117 U/L 60 68   AST (SGOT)      15 - 37 U/L 22 35   ALT (SGPT)      16 - 61 U/L 42 47   GFR CKD-EPI      >=60.00 mL/min/1.73 m² 90.24    GFR      >=60 patient's history of ankylosing spondylitis is noted along with his family history of Crohn's disease. Inflammatory bowel disease cannot be excluded. Celiac sprue cannot be excluded.   Elective sprue serology should be obtained (especially in light of maegan

## 2020-05-29 DIAGNOSIS — Z01.812 PRE-PROCEDURAL LABORATORY EXAMINATION: Primary | ICD-10-CM

## 2020-05-31 ENCOUNTER — LAB SERVICES (OUTPATIENT)
Dept: LAB | Age: 40
End: 2020-05-31

## 2020-05-31 DIAGNOSIS — Z01.812 PRE-PROCEDURAL LABORATORY EXAMINATION: ICD-10-CM

## 2020-05-31 PROCEDURE — 87635 SARS-COV-2 COVID-19 AMP PRB: CPT | Performed by: INTERNAL MEDICINE

## 2020-06-01 ENCOUNTER — HOSPITAL ENCOUNTER (OUTPATIENT)
Dept: RESPIRATORY THERAPY | Facility: HOSPITAL | Age: 40
End: 2020-06-01
Attending: INTERNAL MEDICINE
Payer: MEDICAID

## 2020-06-01 LAB
SARS-COV-2 RNA SPEC QL NAA+PROBE: NOT DETECTED
SERVICE CMNT-IMP: NORMAL
SPECIMEN SOURCE: NORMAL

## 2020-06-02 ENCOUNTER — HOSPITAL ENCOUNTER (OUTPATIENT)
Dept: GASTROENTEROLOGY | Age: 40
Discharge: HOME | End: 2020-06-02
Attending: INTERNAL MEDICINE

## 2020-06-02 DIAGNOSIS — K21.9 GASTROESOPHAGEAL REFLUX DISEASE: ICD-10-CM

## 2020-06-02 RX ORDER — DEXTROSE AND SODIUM CHLORIDE 5; .45 G/100ML; G/100ML
INJECTION, SOLUTION INTRAVENOUS CONTINUOUS
Status: CANCELLED | OUTPATIENT
Start: 2020-06-02

## 2020-06-03 ENCOUNTER — TELEPHONE (OUTPATIENT)
Dept: RHEUMATOLOGY | Facility: CLINIC | Age: 40
End: 2020-06-03

## 2020-06-08 ENCOUNTER — HOSPITAL ENCOUNTER (OUTPATIENT)
Dept: RESPIRATORY THERAPY | Facility: HOSPITAL | Age: 40
Discharge: HOME | End: 2020-06-08
Attending: INTERNAL MEDICINE
Payer: MEDICAID

## 2020-06-08 NOTE — PFT
Patient was called Friday and Monday morning regarding the need for a COVID swab test prior to having Pulmonary Function test. Message was left on voice mail to give information and phone number to call to schedule these.

## 2020-06-11 ENCOUNTER — VIRTUAL PHONE E/M (OUTPATIENT)
Dept: RHEUMATOLOGY | Facility: CLINIC | Age: 40
End: 2020-06-11
Payer: MEDICAID

## 2020-06-11 ENCOUNTER — TELEPHONE (OUTPATIENT)
Dept: RHEUMATOLOGY | Facility: CLINIC | Age: 40
End: 2020-06-11

## 2020-06-11 DIAGNOSIS — M45.9 ANKYLOSING SPONDYLITIS, UNSPECIFIED SITE OF SPINE (HCC): Primary | ICD-10-CM

## 2020-06-11 DIAGNOSIS — Z51.81 THERAPEUTIC DRUG MONITORING: ICD-10-CM

## 2020-06-11 PROCEDURE — 99214 OFFICE O/P EST MOD 30 MIN: CPT | Performed by: INTERNAL MEDICINE

## 2020-06-11 NOTE — PATIENT INSTRUCTIONS
1.  Set up for PFTs - given his ongoing sob -/wilson - rule out as related changes   2. Stop sulfasalazine   3. Cont. alloupurinol 100mga day   4. Plan to start humira   5. Return to clinic in 1 month.     6.Cont meloxicam 15mg a day - take with food  Adalimum skin  · swelling of the ankles  · swollen lymph nodes in the neck, underarm, or groin areas  · unexplained weight loss  · unusual bleeding or bruising  · unusually weak or tired  Side effects that usually do not require medical attention (report to your do or preservatives  · pregnant or trying to get pregnant  · breast-feeding  What should I watch for while using this medicine? Visit your doctor or health care professional for regular checks on your progress.  Tell your doctor or healthcare professional if

## 2020-06-11 NOTE — PROGRESS NOTES
Virtual Telephone Check-In    Elsi Whalen verbally consents to  a Virtual/Telephone Check-In visit on 06/11/20. Patient has been referred to the [facility] website at [email or web address] to review the yearly Consent to Treat document.     Patient und working. In the last few years, his knees are more painful and he feels creaking in them. He has felt more pain in his knees and they area \"shot\". His back is insanely bad per pt. He can't hold his infant baby.  He feels a burning sensation in his the last two year. He had mri of his knees at [place] - and it returned \"as nothing. \"about 3-4 years. .   Even on his knees, he has pain. He feels crunching of his knee caps.        3/12/2020  He has back back pain - 5/10 pain now  He gest sciatic a pa better. Still in pain all the time. He didn't get the EGD. He was supposed to do PFTs but not able to do covid test prior to this. He feels sick on ssz - he is not better on sulfasalazine. His back and knee pain. Janna his back is really hurting.    He OTHER SURGICAL HISTORY      shoulder right repair   • SHOULDER ARTHROSCOPY     • UPPER GI ENDOSCOPY PERFORMED  ~2008      No family history on file. 1 brother , no fh of psoraisis.    Grandmother, 2 uncles and a cousin has crohn's,   Mom - has arthrits 1.5 - 8.1 U/L 6.2   CK      39 - 308 U/L 286   C-REACTIVE PROTEIN      <0.30 mg/dL 1.02 (H)   SED RATE      0 - 15 mm/Hr 14   RUSTY SCREEN WITH REFLEX (S)      Negative Negative   HCV AB      Nonreactive  Nonreactive   C-Citrullinated Peptide IgG AB      0.0 apparent. 4. No suspicious osseous lesions are detected. 5. Lesser incidental findings as above. 2/29/2020 - b/l knee xray   normal  ASSESSMENT AND PLAN:   Elsi Whalen is a 46year old male who presents for No chief complaint on file.

## 2020-06-15 NOTE — TELEPHONE ENCOUNTER
PA approved through 6/12/2021. Case # [id number]. Humira script forwarded to [facility] pharmacy as approved by Dr. Amado More on 6/11. Attempted to contact pt. Left message to call back. Ext [phone] today.

## 2020-06-26 NOTE — TELEPHONE ENCOUNTER
LOV: 6-11-20  Last Refilled:#90, 0rfs  4/1/20    Future Appointments   Date Time Provider Navya Petit   9/3/2020  8:30 AM MD [facility]       Please advise.

## 2020-06-27 RX ORDER — MELOXICAM 15 MG/1
15 TABLET ORAL DAILY
Qty: 90 TABLET | Refills: 0 | Status: SHIPPED | OUTPATIENT
Start: 2020-06-27 | End: 2020-09-18

## 2020-07-09 NOTE — TELEPHONE ENCOUNTER
4th attempt - left message asking pt to call back and confirm he has received medication. Pt was instructed to schedule injection teaching appt with nursing and f/u with Dr. Samia Macario. No response letter mailed to address on file.

## 2020-07-22 ENCOUNTER — VIRTUAL PHONE E/M (OUTPATIENT)
Dept: RHEUMATOLOGY | Facility: CLINIC | Age: 40
End: 2020-07-22
Payer: MEDICAID

## 2020-07-22 ENCOUNTER — TELEPHONE (OUTPATIENT)
Dept: RHEUMATOLOGY | Facility: CLINIC | Age: 40
End: 2020-07-22

## 2020-07-22 DIAGNOSIS — Z51.81 THERAPEUTIC DRUG MONITORING: ICD-10-CM

## 2020-07-22 DIAGNOSIS — M45.9 ANKYLOSING SPONDYLITIS, UNSPECIFIED SITE OF SPINE (HCC): Primary | ICD-10-CM

## 2020-07-22 DIAGNOSIS — R06.00 DOE (DYSPNEA ON EXERTION): ICD-10-CM

## 2020-07-22 PROCEDURE — 99214 OFFICE O/P EST MOD 30 MIN: CPT | Performed by: INTERNAL MEDICINE

## 2020-07-22 NOTE — PATIENT INSTRUCTIONS
1.  Set up for PFTs again   2. Plan to start humira 40mg every other week - starting tomorrow   3. Cont. alloupurinol 100mga day   4. .Cont meloxicam 15mg a day - take with food  5. Return to clinic in 1-2 months.    Adalimumab Injection  Brand Names: EARL lymph nodes in the neck, underarm, or groin areas  · unexplained weight loss  · unusual bleeding or bruising  · unusually weak or tired  Side effects that usually do not require medical attention (report to your doctor or health care professional if they c get pregnant  · breast-feeding  What should I watch for while using this medicine? Visit your doctor or health care professional for regular checks on your progress.  Tell your doctor or healthcare professional if your symptoms do not start to get better o

## 2020-07-22 NOTE — TELEPHONE ENCOUNTER
Pt added to schedule as requested:    Future Appointments   Date Time Provider Navya Petit   9/23/2020 11:00 AM Tucker Domingo MD [facility]

## 2020-07-22 NOTE — PROGRESS NOTES
Virtual Telephone Check-In    Zohra Jones verbally consents to a Virtual/Telephone Check-In visit on 07/22/20. Patient has been referred to the [facility] website at [email or web address] to review the yearly Consent to Treat document.     Patient undrosie working. In the last few years, his knees are more painful and he feels creaking in them. He has felt more pain in his knees and they area \"shot\". His back is insanely bad per pt. He can't hold his infant baby.  He feels a burning sensation in his the last two year. He had mri of his knees at [place] - and it returned \"as nothing. \"about 3-4 years. .   Even on his knees, he has pain. He feels crunching of his knee caps.        3/12/2020  He has back back pain - 5/10 pain now  He gest sciatic a pa better. Still in pain all the time. He didn't get the EGD. He was supposed to do PFTs but not able to do covid test prior to this. He feels sick on ssz - he is not better on sulfasalazine. His back and knee pain. Janna his back is really hurting.    He 270 tablet 0   • HYDROcodone-acetaminophen 5-325 MG Oral Tab TK 1 T PO QHS PRN     • PREDNISONE 10 MG Oral Tab Take 6 tabsx 1 day, take 5 tabsx 1 day, take 4 tabsx 1 day,take 3 tabsx 1 day, take 2 tabsx 1 day, take 1 tabsx 1 day, then off 21 tablet 0   • t GI: No nausea, no vomiiting, no abominal pain, no hx of ulcer, GERD - on omeprazoel since age 14,  heartburn, no dyshpagia, no BRBPR or melena,   Sometimes loose stools,   : no dysuria, no hx of kidney stones, when he got dehyrated it would hurt to [facility] AND [facility] Screen      Nonreactive  Nonreactive   Hbsag Screen Index       <0.10   HCV AB      Nonreactive  Nonreactive   HEPATITIS B CORE AB, TOTAL      Nonreactive  Nonreactive   ALT (SGPT)      16 - 61 U/L 63 (H)   AST (SGOT)      15 - 37 U/L 49 (H)   C-REACTIVE P the past was on prednisone, colchicine and indocin    3.  Hx of dehyration and kidney failure in 2 years ago - will get records from [facility] - responsive to iv fluids  He feels like this must have been happening for a while prior to getting into the hopsit

## 2020-07-22 NOTE — TELEPHONE ENCOUNTER
Pt returned call and accepted appt for injection teaching tomorrow at [address] [place] [address] office.     Future Appointments   Date Time Provider Navya Petit   7/23/2020 11:00 AM [facility] 2ND FLR RN [facility] [place]

## 2020-08-15 RX ORDER — CYCLOBENZAPRINE HCL 5 MG
TABLET ORAL
Qty: 60 TABLET | Refills: 1 | Status: SHIPPED | OUTPATIENT
Start: 2020-08-15 | End: 2020-09-18

## 2020-08-15 NOTE — TELEPHONE ENCOUNTER
Requested Prescriptions     Pending Prescriptions Disp Refills   • Adalimumab (HUMIRA PEN) 40 MG/0.4ML Subcutaneous Pen-injector Kit 2 each 5     Sig: Inject 40 mg into the skin every 14 (fourteen) days.      LF: 6/15/20 #2 each w/ 5 RF  LOV: 7/22/20   Futu

## 2020-08-15 NOTE — TELEPHONE ENCOUNTER
Requested Prescriptions     Pending Prescriptions Disp Refills   • cyclobenzaprine 5 MG Oral Tab 60 tablet 1     Sig: TAKE 1 TO 2 TABLETS BY MOUTH AT NIGHT AS NEEDED     LF: 5/26/20 #60 TAB W/ 1 RF  LOV: 7/22/20   Future Appointments   Date Time Provider CARMINE

## 2020-08-24 NOTE — TELEPHONE ENCOUNTER
Last filled: 8/15/2020 #2 each with 5 refills   LOV: 7/22/20  Future Appointments   Date Time Provider Navya Petit   9/3/2020  8:30 AM MD RAY Kirk  YUMIKO MARTIN   9/23/2020 11:00 AM Georgia Proctor MD 2014 [facility] OF [facility]     Labs: 5/2

## 2020-08-25 ENCOUNTER — TELEPHONE (OUTPATIENT)
Dept: RHEUMATOLOGY | Facility: CLINIC | Age: 40
End: 2020-08-25

## 2020-08-25 NOTE — TELEPHONE ENCOUNTER
Pt states that he is due to do his Humira injection tomorrow. Per pt he had not received the medication in the mail. Pt would like to know what the doctor/nurses recommend. Pt would like a call back today.

## 2020-08-25 NOTE — TELEPHONE ENCOUNTER
[facility] Patient stated it was to be automatically delivered monthly.  Patient advised to contact [facility] to schedule delivery of Humira and request expedited delivery si

## 2020-09-18 RX ORDER — CYCLOBENZAPRINE HCL 5 MG
TABLET ORAL
Qty: 60 TABLET | Refills: 1 | OUTPATIENT
Start: 2020-09-18

## 2020-09-18 RX ORDER — PREDNISONE 10 MG/1
TABLET ORAL
Qty: 21 TABLET | Refills: 0 | OUTPATIENT
Start: 2020-09-18

## 2020-09-18 RX ORDER — PREDNISONE 10 MG/1
TABLET ORAL
Qty: 21 TABLET | Refills: 0 | Status: SHIPPED | OUTPATIENT
Start: 2020-09-18 | End: 2020-11-03 | Stop reason: ALTCHOICE

## 2020-09-18 RX ORDER — MELOXICAM 15 MG/1
15 TABLET ORAL DAILY
Qty: 90 TABLET | Refills: 0 | Status: SHIPPED | OUTPATIENT
Start: 2020-09-18 | End: 2020-12-23

## 2020-09-18 RX ORDER — CYCLOBENZAPRINE HCL 5 MG
TABLET ORAL
Qty: 60 TABLET | Refills: 1 | Status: SHIPPED | OUTPATIENT
Start: 2020-09-18 | End: 2021-04-26

## 2020-09-18 NOTE — TELEPHONE ENCOUNTER
Last filled: 8/24/20 #2 each with 5 refills   LOV: 7/22/2020  Future Appointments   Date Time Provider Navya Petit   9/23/2020 11:00 AM Petty Gaitan MD 2014 [facility]     Labs: 5/27/2020  Summary:  1. Set up for PFTs again   2. Plan to start humira 40mg every other week - starting tomorrow   3. Cont. alloupurinol 100mga day   4. .Cont meloxicam 15mg a day - take with food  5. Return to clinic in 1-2 months. In person  Sammi Pena MD  7/22/2020   5:07 PM  - [place]ed [place]- [place]  through Epic     Please advise.

## 2020-09-18 NOTE — TELEPHONE ENCOUNTER
Last filled: 6/27/2020 #90 tab with 0 refills  LOV: 7/22/2020  Future Appointments   Date Time Provider Navya Petit   9/23/2020 11:00 AM Abdifatah Juarez MD [facility]     Labs: 5/27/2020  Summary:  1. Set up for PFTs again   2.  Plan to s

## 2020-09-18 NOTE — TELEPHONE ENCOUNTER
Last filled: 8/15/20 #60 tab with 1 refill  LOV: 7/22/2020  Future Appointments   Date Time Provider Navya Petit   9/23/2020 11:00 AM Sanjiv Walls MD 2014 [facility] SYSTEM OF THE [facility]     Labs: 5/27/2020  Summary:  1. Set up for PFTs again   2.  Plan to star

## 2020-09-18 NOTE — TELEPHONE ENCOUNTER
Last filled: 3/12/2020 #21 tab with 0 refills   LOV: 7/22/2020  Future Appointments   Date Time Provider Navya Petit   9/23/2020 11:00 AM Marita Agudelo MD 2014 [facility] OF THE [facility]     Labs: 5/27/2020  Summary:  1. Set up for PFTs again   2.  Plan to

## 2020-09-21 NOTE — TELEPHONE ENCOUNTER
Called and spoke with patient, name and  was verified. Informed per Dr. Antelmo Haas why he need the refill for pred burstpa., is he having flare up. Patient stated he is not currently having a gout flare up.  He requested refill because current insurance

## 2020-09-23 ENCOUNTER — OFFICE VISIT (OUTPATIENT)
Dept: RHEUMATOLOGY | Facility: CLINIC | Age: 40
End: 2020-09-23
Payer: MEDICAID

## 2020-09-23 VITALS
WEIGHT: 212 LBS | DIASTOLIC BLOOD PRESSURE: 80 MMHG | BODY MASS INDEX: 28.1 KG/M2 | HEIGHT: 73 IN | SYSTOLIC BLOOD PRESSURE: 142 MMHG | RESPIRATION RATE: 16 BRPM | HEART RATE: 79 BPM

## 2020-09-23 DIAGNOSIS — M45.9 ANKYLOSING SPONDYLITIS, UNSPECIFIED SITE OF SPINE (HCC): Primary | ICD-10-CM

## 2020-09-23 DIAGNOSIS — Z51.81 THERAPEUTIC DRUG MONITORING: ICD-10-CM

## 2020-09-23 DIAGNOSIS — M79.10 MYALGIA: ICD-10-CM

## 2020-09-23 PROCEDURE — 3008F BODY MASS INDEX DOCD: CPT | Performed by: INTERNAL MEDICINE

## 2020-09-23 PROCEDURE — 3079F DIAST BP 80-89 MM HG: CPT | Performed by: INTERNAL MEDICINE

## 2020-09-23 PROCEDURE — 3077F SYST BP >= 140 MM HG: CPT | Performed by: INTERNAL MEDICINE

## 2020-09-23 PROCEDURE — 99214 OFFICE O/P EST MOD 30 MIN: CPT | Performed by: INTERNAL MEDICINE

## 2020-09-23 NOTE — PROGRESS NOTES
Graeme Avalos is a 45year old male who presents for Patient presents with: Ankylosing Spondylitis  Medication Follow-Up  . HPI:     I had the pleasure of seeing Graeme Avalos on 2/3/2020 for evaluation.      He is a pleasant 46year old wh hurting. He had an xray years ago. He thought it was from work. Never had physical therapy for his back. Never had injection in his back. He has no relieft with sitting and laying down. Resting doesn't help.      He has hurt his shoulder in the past years as well. He was on prednisone for 8 months 2 years ago for gout  and then was dehydated severely . 4/1/2020  TELEPHONE VISIT  He never got the TB test and hepatitis testing.  And never started humira b/c of pending tests and b/c of covid 19 pand He is back at work    d/w him the lfts - will stop ssz and see if this improves. 7/22/2020  TELEPHONE VISIT  He was late to set up with humira. He is working a lot on the house and he is only getting the nurse visit tomorrow.    He is going to try to Meloxicam 15 MG Oral Tab Take 1 tablet (15 mg total) by mouth daily. 90 tablet 0   • Adalimumab (HUMIRA PEN) 40 MG/0.4ML Subcutaneous Pen-injector Kit Inject 40 mg into the skin every 14 (fourteen) days.  2 each 5   • allopurinol 100 MG Oral Tab Take 1 tabl working right now.    Studying now -      REVIEW OF SYSTEMS:   Review Of Systems:  Fatigue  Constitutional:No fever, no change in weight or appetitie  Derm: No rashes, no oral ulcers, no alopecia, no photosensitivity, no psoriasis  HEENT: No dry eyes, no dr 0 - 15 mm/Hr 14   RUSTY SCREEN WITH REFLEX (S)      Negative Negative   HCV AB      Nonreactive  Nonreactive   C-Citrullinated Peptide IgG AB      0.0 - 6.9 U/mL <0.4   RHEUMATOID FACTOR      <15 IU/mL <10   HLA-B27      Negative Positive (A)     Component iliac-sided aspect of the sacroiliac joint. No further significant degenerative, erosive, or inflammatory arthropathy of the sacroiliac joints is detected.      2. A mild, diffuse disc bulge is present at L5-S1 with a superimposed disc protrusion that cause prednisone. 4. Trying testosterone x 1 1/2 months - d/w him will check level - he is getting this from friends. - d/w him to stop   5. Tinnitus in hears - gradually increased the last few years. 6. Hx fo sciatica as well   7. fh of crohn's -   8.

## 2020-09-23 NOTE — PATIENT INSTRUCTIONS
1.  Set up for PFTs again -   2. Cont. humira 40mg every other week   3. Cont. alloupurinol 100mga day   4. .Cont meloxicam 15mg a day - take with food  5. Return to clinic in 4 months. In person  6.  Start PT -

## 2020-10-09 ENCOUNTER — LAB ENCOUNTER (OUTPATIENT)
Dept: LAB | Facility: HOSPITAL | Age: 40
End: 2020-10-09
Attending: INTERNAL MEDICINE
Payer: MEDICAID

## 2020-10-09 DIAGNOSIS — Z01.812 PRE-PROCEDURE LAB EXAM: Primary | ICD-10-CM

## 2020-10-12 ENCOUNTER — HOSPITAL ENCOUNTER (OUTPATIENT)
Dept: RESPIRATORY THERAPY | Facility: HOSPITAL | Age: 40
Discharge: HOME | End: 2020-10-12
Attending: INTERNAL MEDICINE
Payer: MEDICAID

## 2020-10-12 DIAGNOSIS — R06.00 DOE (DYSPNEA ON EXERTION): ICD-10-CM

## 2020-10-12 PROCEDURE — 94726 PLETHYSMOGRAPHY LUNG VOLUMES: CPT | Performed by: INTERNAL MEDICINE

## 2020-10-12 PROCEDURE — 94729 DIFFUSING CAPACITY: CPT | Performed by: INTERNAL MEDICINE

## 2020-10-12 PROCEDURE — 94060 EVALUATION OF WHEEZING: CPT | Performed by: INTERNAL MEDICINE

## 2020-10-13 NOTE — PROCEDURES
[facility]    PFT Interpretation    [address] [facility]     1980 MRN [id number]   Height  68in Age 45year old   Weight  212 lb Sex Male         Spirometry:   Normal spirometry with no evidence of obstructive lung disease  FEV1 5.

## 2020-11-02 ENCOUNTER — TELEPHONE (OUTPATIENT)
Dept: RHEUMATOLOGY | Facility: CLINIC | Age: 40
End: 2020-11-02

## 2020-11-02 DIAGNOSIS — M45.9 ANKYLOSING SPONDYLITIS, UNSPECIFIED SITE OF SPINE (HCC): Primary | ICD-10-CM

## 2020-11-02 DIAGNOSIS — G89.29 CHRONIC LOW BACK PAIN, UNSPECIFIED BACK PAIN LATERALITY, UNSPECIFIED WHETHER SCIATICA PRESENT: ICD-10-CM

## 2020-11-02 DIAGNOSIS — M54.50 CHRONIC LOW BACK PAIN, UNSPECIFIED BACK PAIN LATERALITY, UNSPECIFIED WHETHER SCIATICA PRESENT: ICD-10-CM

## 2020-11-02 NOTE — TELEPHONE ENCOUNTER
Should it be changed to Rehab? [facility]?     Referral Type: PT SPINE EVAL & TREAT ([facility]) Dx: Chronic bilateral low back pain without sciatica (M54.5,G89.29)  Inflammatory back pain (M54.89)         Number of Visits: 8

## 2020-11-02 NOTE — TELEPHONE ENCOUNTER
Please see patient's messages below and advise. Follow up to discuss other medication options? Patient is requesting that referral for lower back physical therapy be renewed.  Patient states he was advised by [id number] [place] [address] that referral would have to be

## 2020-11-02 NOTE — TELEPHONE ENCOUNTER
Renewed physical therapy order - please let him know - likely will have to get the fax number and fax over the order

## 2020-11-02 NOTE — TELEPHONE ENCOUNTER
Patient is requesting that referral for lower back physical therapy be renewed. Patient states he was advised by [id number] that referral would have to be renewed. Patient is requesting to go to [address] [place] [address] for physical therapy.      # [phone]

## 2020-11-02 NOTE — TELEPHONE ENCOUNTER
Patient wanted to inform Dr. Sb Brock that he stopped taking Humira medication. Patient states medication was giving him more pain and was not working for him.

## 2020-11-03 PROBLEM — M25.512 LEFT SHOULDER PAIN: Status: ACTIVE | Noted: 2020-11-03

## 2020-11-03 NOTE — TELEPHONE ENCOUNTER
Will create a [facility] order- I just renewed the old PT order - ok to let pt.  Know I renewed the old order - he can goto any [facility] location

## 2020-11-03 NOTE — TELEPHONE ENCOUNTER
Left message referral is in the system and [facility] is able to see the referral. Call office back with any questions or concerns.

## 2020-11-03 NOTE — TELEPHONE ENCOUNTER
Spoke with patient and he is set up to have PT at [address] [place] [address]. He is not going to have PT with [facility]; referral for [facility] cancelled at this time. Message sent to [employer] since patient has Medicaid replacement insurance.

## 2020-11-03 NOTE — TELEPHONE ENCOUNTER
Patient is returning phone call and has further questions. Patient is requesting call back at # [phone].

## 2020-11-09 ENCOUNTER — TELEPHONE (OUTPATIENT)
Dept: PHYSICAL THERAPY | Facility: HOSPITAL | Age: 40
End: 2020-11-09

## 2020-11-11 ENCOUNTER — HOSPITAL ENCOUNTER (OUTPATIENT)
Dept: GENERAL RADIOLOGY | Facility: HOSPITAL | Age: 40
Discharge: HOME | End: 2020-11-11
Attending: ORTHOPAEDIC SURGERY
Payer: MEDICAID

## 2020-11-11 ENCOUNTER — APPOINTMENT (OUTPATIENT)
Dept: PHYSICAL THERAPY | Age: 40
End: 2020-11-11
Attending: INTERNAL MEDICINE
Payer: MEDICAID

## 2020-11-11 ENCOUNTER — OFFICE VISIT (OUTPATIENT)
Dept: ORTHOPEDICS CLINIC | Facility: CLINIC | Age: 40
End: 2020-11-11
Payer: MEDICAID

## 2020-11-11 VITALS
DIASTOLIC BLOOD PRESSURE: 74 MMHG | BODY MASS INDEX: 26.51 KG/M2 | SYSTOLIC BLOOD PRESSURE: 119 MMHG | HEART RATE: 89 BPM | WEIGHT: 200 LBS | HEIGHT: 73 IN | RESPIRATION RATE: 16 BRPM

## 2020-11-11 DIAGNOSIS — R52 PAIN: Primary | ICD-10-CM

## 2020-11-11 DIAGNOSIS — R52 PAIN: ICD-10-CM

## 2020-11-11 DIAGNOSIS — M75.42 ROTATOR CUFF IMPINGEMENT SYNDROME OF LEFT SHOULDER: ICD-10-CM

## 2020-11-11 PROCEDURE — 3078F DIAST BP <80 MM HG: CPT | Performed by: ORTHOPAEDIC SURGERY

## 2020-11-11 PROCEDURE — 20610 DRAIN/INJ JOINT/BURSA W/O US: CPT | Performed by: ORTHOPAEDIC SURGERY

## 2020-11-11 PROCEDURE — 73030 X-RAY EXAM OF SHOULDER: CPT | Performed by: ORTHOPAEDIC SURGERY

## 2020-11-11 PROCEDURE — 99243 OFF/OP CNSLTJ NEW/EST LOW 30: CPT | Performed by: ORTHOPAEDIC SURGERY

## 2020-11-11 PROCEDURE — 3074F SYST BP LT 130 MM HG: CPT | Performed by: ORTHOPAEDIC SURGERY

## 2020-11-11 PROCEDURE — 3008F BODY MASS INDEX DOCD: CPT | Performed by: ORTHOPAEDIC SURGERY

## 2020-11-11 RX ORDER — TRIAMCINOLONE ACETONIDE 40 MG/ML
40 INJECTION, SUSPENSION INTRA-ARTICULAR; INTRAMUSCULAR ONCE
Status: COMPLETED | OUTPATIENT
Start: 2020-11-11 | End: 2020-11-11

## 2020-11-11 RX ORDER — TRIAMCINOLONE ACETONIDE 40 MG/ML
40 INJECTION, SUSPENSION INTRA-ARTICULAR; INTRAMUSCULAR ONCE
Status: DISCONTINUED | OUTPATIENT
Start: 2020-11-11 | End: 2021-02-04 | Stop reason: ALTCHOICE

## 2020-11-11 RX ORDER — OXYCODONE HYDROCHLORIDE 30 MG/1
30 TABLET ORAL EVERY 8 HOURS PRN
COMMUNITY
Start: 2020-10-22

## 2020-11-11 RX ADMIN — TRIAMCINOLONE ACETONIDE 40 MG: 40 INJECTION, SUSPENSION INTRA-ARTICULAR; INTRAMUSCULAR at 10:40:00

## 2020-11-11 NOTE — PROGRESS NOTES
NURSING INTAKE COMMENTS: Patient presents with:  Shoulder Pain: left- pt states pain started a couple months ago. pt denies any injury. HPI: This 45year old male presents today with complaints of pain in the left shoulder.   The pain began insidiousl reviewed. No pertinent family history.     Social History    Occupational History      Not on file    Tobacco Use      Smoking status: Never Smoker      Smokeless tobacco: Never Used    Substance and Sexual Activity      Alcohol use: Yes        Comment: occ joint. SOFT TISSUES: Negative. No visible soft tissue swelling. EFFUSION: None visible. OTHER: Negative. CONCLUSION: Mild glenohumeral osteoarthritis.     Dictated by (CST): Ayah Lemus MD on 11/11/2020 at 10:26 AM     Finalized by (CST): Hugo

## 2020-11-13 ENCOUNTER — APPOINTMENT (OUTPATIENT)
Dept: PHYSICAL THERAPY | Age: 40
End: 2020-11-13
Attending: INTERNAL MEDICINE
Payer: MEDICAID

## 2020-11-18 ENCOUNTER — APPOINTMENT (OUTPATIENT)
Dept: PHYSICAL THERAPY | Age: 40
End: 2020-11-18
Attending: INTERNAL MEDICINE
Payer: MEDICAID

## 2020-11-19 ENCOUNTER — TELEPHONE (OUTPATIENT)
Dept: PHYSICAL THERAPY | Facility: HOSPITAL | Age: 40
End: 2020-11-19

## 2020-11-20 ENCOUNTER — APPOINTMENT (OUTPATIENT)
Dept: PHYSICAL THERAPY | Age: 40
End: 2020-11-20
Attending: INTERNAL MEDICINE
Payer: MEDICAID

## 2020-11-27 ENCOUNTER — TELEPHONE (OUTPATIENT)
Dept: PHYSICAL THERAPY | Age: 40
End: 2020-11-27

## 2020-11-27 ENCOUNTER — APPOINTMENT (OUTPATIENT)
Dept: PHYSICAL THERAPY | Age: 40
End: 2020-11-27
Attending: INTERNAL MEDICINE
Payer: MEDICAID

## 2020-11-30 ENCOUNTER — APPOINTMENT (OUTPATIENT)
Dept: PHYSICAL THERAPY | Age: 40
End: 2020-11-30
Attending: INTERNAL MEDICINE
Payer: MEDICAID

## 2020-12-02 ENCOUNTER — APPOINTMENT (OUTPATIENT)
Dept: PHYSICAL THERAPY | Age: 40
End: 2020-12-02
Attending: INTERNAL MEDICINE
Payer: MEDICAID

## 2020-12-07 ENCOUNTER — APPOINTMENT (OUTPATIENT)
Dept: PHYSICAL THERAPY | Age: 40
End: 2020-12-07
Attending: INTERNAL MEDICINE
Payer: MEDICAID

## 2020-12-23 RX ORDER — MELOXICAM 15 MG/1
15 TABLET ORAL DAILY
Qty: 90 TABLET | Refills: 0 | Status: SHIPPED | OUTPATIENT
Start: 2020-12-23 | End: 2021-03-29

## 2020-12-23 NOTE — TELEPHONE ENCOUNTER
Requested Prescriptions     Pending Prescriptions Disp Refills   • Meloxicam 15 MG Oral Tab 90 tablet 0     Sig: Take 1 tablet (15 mg total) by mouth daily.      LF: 9/18/20 #90 TAB W/ 0 RF  LOV: 9/23/20  Future Appointments   Date Time Provider Department

## 2021-01-20 ENCOUNTER — OFFICE VISIT (OUTPATIENT)
Dept: RHEUMATOLOGY | Facility: CLINIC | Age: 41
End: 2021-01-20
Payer: MEDICAID

## 2021-01-20 VITALS
DIASTOLIC BLOOD PRESSURE: 81 MMHG | HEIGHT: 73 IN | HEART RATE: 80 BPM | SYSTOLIC BLOOD PRESSURE: 118 MMHG | WEIGHT: 182 LBS | BODY MASS INDEX: 24.12 KG/M2 | RESPIRATION RATE: 15 BRPM

## 2021-01-20 DIAGNOSIS — M79.10 MYALGIA: ICD-10-CM

## 2021-01-20 DIAGNOSIS — M10.9 GOUT, UNSPECIFIED CAUSE, UNSPECIFIED CHRONICITY, UNSPECIFIED SITE: ICD-10-CM

## 2021-01-20 DIAGNOSIS — Z51.81 THERAPEUTIC DRUG MONITORING: ICD-10-CM

## 2021-01-20 DIAGNOSIS — G89.29 CHRONIC BILATERAL LOW BACK PAIN, UNSPECIFIED WHETHER SCIATICA PRESENT: ICD-10-CM

## 2021-01-20 DIAGNOSIS — E55.9 VITAMIN D DEFICIENCY: ICD-10-CM

## 2021-01-20 DIAGNOSIS — M45.9 ANKYLOSING SPONDYLITIS, UNSPECIFIED SITE OF SPINE (HCC): Primary | ICD-10-CM

## 2021-01-20 DIAGNOSIS — M54.50 CHRONIC BILATERAL LOW BACK PAIN, UNSPECIFIED WHETHER SCIATICA PRESENT: ICD-10-CM

## 2021-01-20 PROCEDURE — 99214 OFFICE O/P EST MOD 30 MIN: CPT | Performed by: INTERNAL MEDICINE

## 2021-01-20 PROCEDURE — 3079F DIAST BP 80-89 MM HG: CPT | Performed by: INTERNAL MEDICINE

## 2021-01-20 PROCEDURE — 3008F BODY MASS INDEX DOCD: CPT | Performed by: INTERNAL MEDICINE

## 2021-01-20 PROCEDURE — 3074F SYST BP LT 130 MM HG: CPT | Performed by: INTERNAL MEDICINE

## 2021-01-20 RX ORDER — GABAPENTIN 300 MG/1
300 CAPSULE ORAL NIGHTLY
Qty: 30 CAPSULE | Refills: 2 | Status: SHIPPED | OUTPATIENT
Start: 2021-01-20 | End: 2021-04-12

## 2021-01-20 NOTE — PROGRESS NOTES
Marbella Mccallum is a 45year old male who presents for Patient presents with: Ankylosing Spondylitis: all over pain  Medication Follow-Up  Fatigue  Weight Loss  .    HPI:     I had the pleasure of seeing Marbella Mccallum on 2/3/2020 for evaluation day.   For 15 years his back has been hurting. He had an xray years ago. He thought it was from work. Never had physical therapy for his back. Never had injection in his back. He has no relieft with sitting and laying down. Resting doesn't help. He has had sciatica pain over the years as well. He was on prednisone for 8 months 2 years ago for gout  and then was dehydated severely . 4/1/2020  TELEPHONE VISIT  He never got the TB test and hepatitis testing.  And never started humira b/c of pe He thinks the coronavirus is BS. He is back at work    d/w him the lfts - will stop ssz and see if this improves. 7/22/2020  TELEPHONE VISIT  He was late to set up with humira.  He is working a lot on the house and he is only getting the nurse visit baby was too hard. He's on this daily  oxycodone - this helps him the most. 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 1/2 - to 1 tablets a day. He stopped the humira after 2 1/2 months b/c he felt worse on it .    He wants to see i use: Yes      Comment: occasionally    Drug use: Yes      Types: Cannabis     drank a few times a week for this ylast year - prior to that he drank daily  To help \"kill the pain\"  Red meat every couple of weeks   , 132 week old daugther.    Not w Testosterone, Free, S      4.65 - 18.1 ng/dL 111 (H)   Testosterone, Total, S      240 - 950 ng/dL 2850 (H)   ALDOLASE, SERUM      1.5 - 8.1 U/L 6.2   CK      39 - 308 U/L 286   C-REACTIVE PROTEIN      <0.30 mg/dL 1.02 (H)   SED RATE      0 - 15 mm/Hr 14 C-REACTIVE PROTEIN      <0.30 mg/dL  0.66 (H)   SED RATE      0 - 15 mm/Hr  11   URIC ACID      3.4 - 7.0 mg/dL 3.7      2/3/2020 - lumbar xray and si joint xray - normal    2/29/2020 - mri si joint   1.  There is minimal edema involving the left iliac-si last 2 year. -  - vicodine /oxycodone- per antoher physician   prednisoen may have helped him partially while he was on it.   - never did physical therapy      2.  Right foot gout - stable on 1 year of allopurinol 100mg a day - in the past was on prednisone

## 2021-01-20 NOTE — PATIENT INSTRUCTIONS
1. Try low dose naltrexone 4.5mg a day   2. After 2 weeks, try  gabapentin 300mg at night   3. Cont. alloupurinol 100mga day   4. .Cont meloxicam 15mg a day - take with food  5. Return to clinic in 2-3 months.    6. Try physical therapy  For lower back - bothersome):  · dizziness  · drowsiness  · headache  · nausea, vomiting  · swelling of ankles, feet, hands  · tiredness  What may interact with this medicine?   This medicine may interact with the following medications:  · alcohol  · antihistamines for [facility] this medicine? Visit your doctor or health care professional for regular checks on your progress. You may want to keep a record at home of how you feel your condition is responding to treatment.  You may want to share this information with your doctor or h [employer]

## 2021-01-23 ENCOUNTER — LAB ENCOUNTER (OUTPATIENT)
Dept: LAB | Facility: HOSPITAL | Age: 41
End: 2021-01-23
Attending: INTERNAL MEDICINE
Payer: MEDICAID

## 2021-01-23 DIAGNOSIS — M10.9 GOUT, UNSPECIFIED CAUSE, UNSPECIFIED CHRONICITY, UNSPECIFIED SITE: ICD-10-CM

## 2021-01-23 DIAGNOSIS — M45.9 ANKYLOSING SPONDYLITIS, UNSPECIFIED SITE OF SPINE (HCC): ICD-10-CM

## 2021-01-23 DIAGNOSIS — E55.9 VITAMIN D DEFICIENCY: ICD-10-CM

## 2021-01-23 LAB
ALBUMIN SERPL-MCNC: 3.9 G/DL (ref 3.4–5)
ALBUMIN/GLOB SERPL: 1.2 {RATIO} (ref 1–2)
ALP LIVER SERPL-CCNC: 89 U/L
ALT SERPL-CCNC: 53 U/L
ANION GAP SERPL CALC-SCNC: 3 MMOL/L (ref 0–18)
AST SERPL-CCNC: 30 U/L (ref 15–37)
BILIRUB SERPL-MCNC: 0.4 MG/DL (ref 0.1–2)
BUN BLD-MCNC: 23 MG/DL (ref 7–18)
BUN/CREAT SERPL: 18.7 (ref 10–20)
CALCIUM BLD-MCNC: 8.9 MG/DL (ref 8.5–10.1)
CHLORIDE SERPL-SCNC: 105 MMOL/L (ref 98–112)
CO2 SERPL-SCNC: 30 MMOL/L (ref 21–32)
CREAT BLD-MCNC: 1.23 MG/DL
CRP SERPL-MCNC: <0.29 MG/DL (ref ?–0.3)
ERYTHROCYTE [SEDIMENTATION RATE] IN BLOOD: 7 MM/HR
GLOBULIN PLAS-MCNC: 3.3 G/DL (ref 2.8–4.4)
GLUCOSE BLD-MCNC: 79 MG/DL (ref 70–99)
M PROTEIN MFR SERPL ELPH: 7.2 G/DL (ref 6.4–8.2)
OSMOLALITY SERPL CALC.SUM OF ELEC: 289 MOSM/KG (ref 275–295)
PATIENT FASTING Y/N/NP: NO
POTASSIUM SERPL-SCNC: 5 MMOL/L (ref 3.5–5.1)
SODIUM SERPL-SCNC: 138 MMOL/L (ref 136–145)
URATE SERPL-MCNC: 7.6 MG/DL

## 2021-01-23 PROCEDURE — 36415 COLL VENOUS BLD VENIPUNCTURE: CPT

## 2021-01-23 PROCEDURE — 85652 RBC SED RATE AUTOMATED: CPT

## 2021-01-23 PROCEDURE — 80053 COMPREHEN METABOLIC PANEL: CPT

## 2021-01-23 PROCEDURE — 86140 C-REACTIVE PROTEIN: CPT

## 2021-01-23 PROCEDURE — 82306 VITAMIN D 25 HYDROXY: CPT

## 2021-01-23 PROCEDURE — 84550 ASSAY OF BLOOD/URIC ACID: CPT

## 2021-01-25 LAB — 25(OH)D3 SERPL-MCNC: 74.3 NG/ML (ref 30–100)

## 2021-01-25 NOTE — TELEPHONE ENCOUNTER
Requested Prescriptions     Pending Prescriptions Disp Refills   • Naltrexone HCl Does not apply Powder 0.135 g 1     Sig: Take 4.5mg a day       Receive fax today from [facility] requesting Quantity for medication. Please advise.

## 2021-02-04 PROBLEM — M25.529 ELBOW PAIN: Status: ACTIVE | Noted: 2021-02-04

## 2021-03-29 ENCOUNTER — TELEPHONE (OUTPATIENT)
Dept: RHEUMATOLOGY | Facility: CLINIC | Age: 41
End: 2021-03-29

## 2021-03-29 RX ORDER — MELOXICAM 15 MG/1
15 TABLET ORAL DAILY
Qty: 90 TABLET | Refills: 1 | Status: SHIPPED | OUTPATIENT
Start: 2021-03-29 | End: 2022-02-21 | Stop reason: ALTCHOICE

## 2021-03-29 NOTE — TELEPHONE ENCOUNTER
LOV: 01/20/2021  No future appointments. LABS:   Component      Latest Ref Rng & Units 1/23/2021   Glucose      70 - 99 mg/dL 79   Sodium      136 - 145 mmol/L 138   Potassium      3.5 - 5.1 mmol/L 5.0   Chloride      98 - 112 mmol/L 105   Carbon Dioxide, Total      21.0 - 32.0 mmol/L 30.0   ANION GAP      0 - 18 mmol/L 3   BUN      7 - 18 mg/dL 23 (H)   CREATININE      0.70 - 1.30 mg/dL 1.23   BUN/CREAT Ratio      10.0 - 20.0 18.7   CALCIUM      8.5 - 10.1 mg/dL 8.9   CALCULATED OSMOLALITY      275 - 295 mOsm/kg 289   eGFR NON-AFR. AMERICAN      >=60 73   eGFR AFRICAN AMERICAN      >=60 85   ALT (SGPT)      16 - 61 U/L 53   AST (SGOT)      15 - 37 U/L 30   ALKALINE PHOSPHATASE      45 - 117 U/L 89   Total Bilirubin      0.1 - 2.0 mg/dL 0.4   TOTAL PROTEIN      6.4 - 8.2 g/dL 7.2   Albumin      3.4 - 5.0 g/dL 3.9   Globulin      2.8 - 4.4 g/dL 3.3   A/G Ratio      1.0 - 2.0 1.2   Patient Fasting?        No   URIC ACID      3.5 - 7.2 mg/dL 7.6 (H)   C-REACTIVE PROTEIN      <0.30 mg/dL <0.29   SED RATE      0 - 15 mm/Hr 7   Vitamin D, 25OH, Total      30.0 - 100.0 ng/mL 74.3

## 2021-04-12 RX ORDER — GABAPENTIN 300 MG/1
300 CAPSULE ORAL NIGHTLY
Qty: 30 CAPSULE | Refills: 3 | Status: SHIPPED | OUTPATIENT
Start: 2021-04-12 | End: 2021-08-27

## 2021-04-12 NOTE — TELEPHONE ENCOUNTER
LOV: 01/20/021  No future appointments. LABS:   Component      Latest Ref Rng & Units 1/23/2021   Glucose      70 - 99 mg/dL 79   Sodium      136 - 145 mmol/L 138   Potassium      3.5 - 5.1 mmol/L 5.0   Chloride      98 - 112 mmol/L 105   Carbon Dioxide, Total      21.0 - 32.0 mmol/L 30.0   ANION GAP      0 - 18 mmol/L 3   BUN      7 - 18 mg/dL 23 (H)   CREATININE      0.70 - 1.30 mg/dL 1.23   BUN/CREAT Ratio      10.0 - 20.0 18.7   CALCIUM      8.5 - 10.1 mg/dL 8.9   CALCULATED OSMOLALITY      275 - 295 mOsm/kg 289   eGFR NON-AFR. AMERICAN      >=60 73   eGFR AFRICAN AMERICAN      >=60 85   ALT (SGPT)      16 - 61 U/L 53   AST (SGOT)      15 - 37 U/L 30   ALKALINE PHOSPHATASE      45 - 117 U/L 89   Total Bilirubin      0.1 - 2.0 mg/dL 0.4   TOTAL PROTEIN      6.4 - 8.2 g/dL 7.2   Albumin      3.4 - 5.0 g/dL 3.9   Globulin      2.8 - 4.4 g/dL 3.3   A/G Ratio      1.0 - 2.0 1.2   Patient Fasting?        No   URIC ACID      3.5 - 7.2 mg/dL 7.6 (H)   C-REACTIVE PROTEIN      <0.30 mg/dL <0.29   SED RATE      0 - 15 mm/Hr 7   Vitamin D, 25OH, Total      30.0 - 100.0 ng/mL 74.3

## 2021-04-24 NOTE — TELEPHONE ENCOUNTER
•  cyclobenzaprine 5 MG Oral Tab, TAKE 1 TO 2 TABLETS BY MOUTH AT NIGHT AS NEEDED, Disp: 60 tablet, Rfl: 1

## 2021-04-26 RX ORDER — CYCLOBENZAPRINE HCL 5 MG
TABLET ORAL
Qty: 60 TABLET | Refills: 0 | Status: SHIPPED | OUTPATIENT
Start: 2021-04-26 | End: 2021-05-14

## 2021-04-26 NOTE — TELEPHONE ENCOUNTER
LOV: 1/20/21  No future appointments.    LABS:  Component      Latest Ref Rng & Units 1/23/2021   Glucose      70 - 99 mg/dL 79   Sodium      136 - 145 mmol/L 138   Potassium      3.5 - 5.1 mmol/L 5.0   Chloride      98 - 112 mmol/L 105   Carbon Dioxide, To

## 2021-05-06 ENCOUNTER — HOSPITAL ENCOUNTER (EMERGENCY)
Facility: HOSPITAL | Age: 41
Discharge: HOME | End: 2021-05-06
Attending: EMERGENCY MEDICINE
Payer: MEDICAID

## 2021-05-06 ENCOUNTER — APPOINTMENT (OUTPATIENT)
Dept: CT IMAGING | Facility: HOSPITAL | Age: 41
End: 2021-05-06
Attending: EMERGENCY MEDICINE
Payer: MEDICAID

## 2021-05-06 VITALS
SYSTOLIC BLOOD PRESSURE: 138 MMHG | HEART RATE: 94 BPM | BODY MASS INDEX: 26 KG/M2 | DIASTOLIC BLOOD PRESSURE: 72 MMHG | TEMPERATURE: 97 F | RESPIRATION RATE: 18 BRPM | WEIGHT: 195 LBS | OXYGEN SATURATION: 98 %

## 2021-05-06 DIAGNOSIS — R10.9 ACUTE RIGHT FLANK PAIN: Primary | ICD-10-CM

## 2021-05-06 DIAGNOSIS — N20.0 NEPHROLITHIASIS: ICD-10-CM

## 2021-05-06 PROCEDURE — 96374 THER/PROPH/DIAG INJ IV PUSH: CPT

## 2021-05-06 PROCEDURE — 74176 CT ABD & PELVIS W/O CONTRAST: CPT | Performed by: EMERGENCY MEDICINE

## 2021-05-06 PROCEDURE — 80048 BASIC METABOLIC PNL TOTAL CA: CPT | Performed by: EMERGENCY MEDICINE

## 2021-05-06 PROCEDURE — 85025 COMPLETE CBC W/AUTO DIFF WBC: CPT | Performed by: EMERGENCY MEDICINE

## 2021-05-06 PROCEDURE — 99284 EMERGENCY DEPT VISIT MOD MDM: CPT

## 2021-05-06 PROCEDURE — 81001 URINALYSIS AUTO W/SCOPE: CPT | Performed by: EMERGENCY MEDICINE

## 2021-05-06 RX ORDER — KETOROLAC TROMETHAMINE 30 MG/ML
30 INJECTION, SOLUTION INTRAMUSCULAR; INTRAVENOUS ONCE
Status: COMPLETED | OUTPATIENT
Start: 2021-05-06 | End: 2021-05-06

## 2021-05-06 NOTE — ED PROVIDER NOTES
Patient Seen in: [facility] AND [facility] Emergency Department    History   Patient presents with:  Abdomen/Flank Pain    Stated Complaint: Lower left back pain     HPI    Patient presents to the emergency department with right flank pain since 3:00 roya chapa Take 1 tablet (100 mg total) by mouth daily. OxyCODONE HCl IR 30 MG Oral Tab,  Take 30 mg by mouth every 8 (eight) hours as needed.    ClonazePAM 1 MG Oral Tab,  TAKE 1 TABLET BY MOUTH THREE TIMES DAILY AS NEEDED FOR ANXIETY         Review of Systems  Con amount of blood as well I discussed there is possibly related to some shifting of a stone but it seems more musculoskeletal it worse when he moves and he is not currently on medicine for his left ear.   I recommended contacting his rheumatologist he already visit. Follow-up:  Kia Anna MD  [address]  [phone]          Mariam Morgan, [facility] [facility] [phone]            Medications Prescribed:  Current Discharge Medic

## 2021-05-14 RX ORDER — CYCLOBENZAPRINE HCL 5 MG
TABLET ORAL
Qty: 60 TABLET | Refills: 3 | Status: SHIPPED | OUTPATIENT
Start: 2021-05-14 | End: 2021-12-16

## 2021-08-27 ENCOUNTER — OFFICE VISIT (OUTPATIENT)
Dept: RHEUMATOLOGY | Facility: CLINIC | Age: 41
End: 2021-08-27
Payer: MEDICAID

## 2021-08-27 ENCOUNTER — TELEPHONE (OUTPATIENT)
Dept: RHEUMATOLOGY | Facility: CLINIC | Age: 41
End: 2021-08-27

## 2021-08-27 ENCOUNTER — LAB ENCOUNTER (OUTPATIENT)
Dept: LAB | Facility: HOSPITAL | Age: 41
End: 2021-08-27
Attending: INTERNAL MEDICINE
Payer: MEDICAID

## 2021-08-27 VITALS
BODY MASS INDEX: 25.84 KG/M2 | SYSTOLIC BLOOD PRESSURE: 133 MMHG | WEIGHT: 195 LBS | HEART RATE: 96 BPM | HEIGHT: 73 IN | RESPIRATION RATE: 16 BRPM | DIASTOLIC BLOOD PRESSURE: 87 MMHG

## 2021-08-27 DIAGNOSIS — M79.10 MYALGIA: ICD-10-CM

## 2021-08-27 DIAGNOSIS — E55.9 VITAMIN D DEFICIENCY: ICD-10-CM

## 2021-08-27 DIAGNOSIS — Z51.81 THERAPEUTIC DRUG MONITORING: ICD-10-CM

## 2021-08-27 DIAGNOSIS — M45.9 ANKYLOSING SPONDYLITIS, UNSPECIFIED SITE OF SPINE (HCC): Primary | ICD-10-CM

## 2021-08-27 DIAGNOSIS — M45.9 ANKYLOSING SPONDYLITIS, UNSPECIFIED SITE OF SPINE (HCC): ICD-10-CM

## 2021-08-27 LAB
ALBUMIN SERPL-MCNC: 4.2 G/DL (ref 3.4–5)
ALBUMIN/GLOB SERPL: 1 {RATIO} (ref 1–2)
ALP LIVER SERPL-CCNC: 102 U/L
ALT SERPL-CCNC: 54 U/L
ANION GAP SERPL CALC-SCNC: 4 MMOL/L (ref 0–18)
AST SERPL-CCNC: 30 U/L (ref 15–37)
BILIRUB SERPL-MCNC: 0.3 MG/DL (ref 0.1–2)
BUN BLD-MCNC: 14 MG/DL (ref 7–18)
BUN/CREAT SERPL: 12.4 (ref 10–20)
CALCIUM BLD-MCNC: 9.6 MG/DL (ref 8.5–10.1)
CHLORIDE SERPL-SCNC: 104 MMOL/L (ref 98–112)
CK SERPL-CCNC: 270 U/L
CO2 SERPL-SCNC: 32 MMOL/L (ref 21–32)
CREAT BLD-MCNC: 1.13 MG/DL
CRP SERPL-MCNC: <0.29 MG/DL (ref ?–0.3)
DEPRECATED RDW RBC AUTO: 43.9 FL (ref 35.1–46.3)
ERYTHROCYTE [DISTWIDTH] IN BLOOD BY AUTOMATED COUNT: 13.2 % (ref 11–15)
ERYTHROCYTE [SEDIMENTATION RATE] IN BLOOD: 11 MM/HR
GLOBULIN PLAS-MCNC: 4.3 G/DL (ref 2.8–4.4)
GLUCOSE BLD-MCNC: 65 MG/DL (ref 70–99)
HCT VFR BLD AUTO: 49.2 %
HGB BLD-MCNC: 16.2 G/DL
M PROTEIN MFR SERPL ELPH: 8.5 G/DL (ref 6.4–8.2)
MCH RBC QN AUTO: 29.9 PG (ref 26–34)
MCHC RBC AUTO-ENTMCNC: 32.9 G/DL (ref 31–37)
MCV RBC AUTO: 90.8 FL
OSMOLALITY SERPL CALC.SUM OF ELEC: 289 MOSM/KG (ref 275–295)
PATIENT FASTING Y/N/NP: NO
PLATELET # BLD AUTO: 313 10(3)UL (ref 150–450)
POTASSIUM SERPL-SCNC: 4.3 MMOL/L (ref 3.5–5.1)
RBC # BLD AUTO: 5.42 X10(6)UL
SODIUM SERPL-SCNC: 140 MMOL/L (ref 136–145)
VIT D+METAB SERPL-MCNC: 108.5 NG/ML (ref 30–100)
WBC # BLD AUTO: 7.9 X10(3) UL (ref 4–11)

## 2021-08-27 PROCEDURE — 82306 VITAMIN D 25 HYDROXY: CPT

## 2021-08-27 PROCEDURE — 99214 OFFICE O/P EST MOD 30 MIN: CPT | Performed by: INTERNAL MEDICINE

## 2021-08-27 PROCEDURE — 3079F DIAST BP 80-89 MM HG: CPT | Performed by: INTERNAL MEDICINE

## 2021-08-27 PROCEDURE — 86480 TB TEST CELL IMMUN MEASURE: CPT

## 2021-08-27 PROCEDURE — 85652 RBC SED RATE AUTOMATED: CPT

## 2021-08-27 PROCEDURE — 82550 ASSAY OF CK (CPK): CPT

## 2021-08-27 PROCEDURE — 85027 COMPLETE CBC AUTOMATED: CPT

## 2021-08-27 PROCEDURE — 86140 C-REACTIVE PROTEIN: CPT

## 2021-08-27 PROCEDURE — 3008F BODY MASS INDEX DOCD: CPT | Performed by: INTERNAL MEDICINE

## 2021-08-27 PROCEDURE — 3075F SYST BP GE 130 - 139MM HG: CPT | Performed by: INTERNAL MEDICINE

## 2021-08-27 PROCEDURE — 80053 COMPREHEN METABOLIC PANEL: CPT

## 2021-08-27 PROCEDURE — 36415 COLL VENOUS BLD VENIPUNCTURE: CPT

## 2021-08-27 RX ORDER — GABAPENTIN 300 MG/1
600 CAPSULE ORAL NIGHTLY
Qty: 60 CAPSULE | Refills: 3 | Status: SHIPPED | OUTPATIENT
Start: 2021-08-27

## 2021-08-27 RX ORDER — METHYLPREDNISOLONE 4 MG/1
TABLET ORAL
Qty: 1 EACH | Refills: 0 | Status: SHIPPED | OUTPATIENT
Start: 2021-08-27

## 2021-08-27 NOTE — PATIENT INSTRUCTIONS
1. Try simponi 50mg a month. 2. Try prednisone burst  -    3. Cont. alloupurinol 100mga day   4. Try gabpaneptin 600mg at night - two 300mg at night   5. Return to clinic in 6 weeks.    6. Behavioral health - call [phone]  Simponi Auto-Injector 50 taking each dose of this medicine on time even if you are feeling well. If you miss a dose, use as soon as you remember. Return to the recommended amount of time between doses. Do not use medicine more frequently to catch up.   Please tell your doctor and experienced serious side effects. Please speak with your doctor to understand the risks and benefits associated with this medicine. This medicine may increase the risk of some types of cancer.  Please speak with your doctor about the risks and benefits of carefully with your pharmacist to understand the risks associated with this medicine. Side Effects  The following is a list of some common side effects from this medicine.  Please speak with your doctor about what you should do if you experience these or o © 2021 [facility] [employer]

## 2021-08-27 NOTE — PROGRESS NOTES
Joao Bingham is a 37year old male who presents for Patient presents with: Ankylosing Spondylitis  Medication Follow-Up  . HPI:     I had the pleasure of seeing Joao Bingham on 2/3/2020 for evaluation.      He is a pleasant 46year old wh hurting. He had an xray years ago. He thought it was from work. Never had physical therapy for his back. Never had injection in his back. He has no relieft with sitting and laying down. Resting doesn't help.      He has hurt his shoulder in the past years as well. He was on prednisone for 8 months 2 years ago for gout  and then was dehydated severely . 4/1/2020  TELEPHONE VISIT  He never got the TB test and hepatitis testing.  And never started humira b/c of pending tests and b/c of covid 19 pand He is back at work    d/w him the lfts - will stop ssz and see if this improves. 7/22/2020  TELEPHONE VISIT  He was late to set up with humira. He is working a lot on the house and he is only getting the nurse visit tomorrow.    He is going to try to daily  oxycodone - this helps him the most. 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 1/2 - to 1 tablets a day. He stopped the humira after 2 1/2 months b/c he felt worse on it . He wants to see if low dose naltrexone.    He never nightly. 30 capsule 3   • azithromycin (ZITHROMAX Z-LAZARO) 250 MG Oral Tab Take two tablets today, then one tablet daily for 4 more days 6 tablet 0   • Meloxicam 15 MG Oral Tab Take 1 tablet (15 mg total) by mouth daily.  90 tablet 1   • Naltrexone HCl Does n disease  RS: No SOB, no Cough, No Pleurtic pain,   GI: No nausea, no vomiiting, no abominal pain, no hx of ulcer, GERD - on omeprazoel since age 14,  heartburn, no dyshpagia, no BRBPR or melena,   Sometimes loose stools,   : no dysuria, no hx of kidney s MCV      80.0 - 100.0 fL  95.4   MCH      26.0 - 34.0 pg  32.1   MCHC      31.0 - 37.0 g/dL  33.6   RDW      11.0 - 15.0 %  14.8   RDW-SD      35.1 - 46.3 fL  51.8 (H)   Platelet Count      295.1 - 450.0 10(3)uL  392.0   ALT (SGPT)      0 - 41 U/L 39 63 5. Lesser incidental findings as above. 2/29/2020 - b/l knee xray   normal  ASSESSMENT AND PLAN:   Mt Levine is a 37year old male who presents for Patient presents with: Ankylosing Spondylitis  Medication Follow-Up      1.  Rachel spring and kidney failure in 2 years ago - will get records from [facility] - responsive to iv fluids  He feels like this must have been happening for a while prior to getting into the hopsital  dehydration was getting worse on prednisone.        4. Trying testoster

## 2021-08-30 LAB
M TB IFN-G CD4+ T-CELLS BLD-ACNC: 0.02 IU/ML
M TB TUBERC IFN-G BLD QL: NEGATIVE
M TB TUBERC IGNF/MITOGEN IGNF CONTROL: >10 IU/ML
QFT TB1 AG MINUS NIL: 0.01 IU/ML
QFT TB2 AG MINUS NIL: 0 IU/ML

## 2021-08-30 NOTE — TELEPHONE ENCOUNTER
IAN Hernandez denied. Patient must have had tried 3 preferred drugs and he has tried 1. Other 2 preferred drugs are Cimzia and Enbrel. Please advise.

## 2021-08-31 ENCOUNTER — TELEPHONE (OUTPATIENT)
Dept: RHEUMATOLOGY | Facility: CLINIC | Age: 41
End: 2021-08-31

## 2021-09-10 RX ORDER — CERTOLIZUMAB PEGOL 200 MG/ML
400 INJECTION, SOLUTION SUBCUTANEOUS
Qty: 1 KIT | Refills: 0 | Status: SHIPPED | OUTPATIENT
Start: 2021-09-10 | End: 2021-10-08

## 2021-09-10 NOTE — TELEPHONE ENCOUNTER
PA for cimzia approved through 9/10/2022. Sent to [facility]. Authorization #: [id number]  Patient informed.      HEIDI Alonzo Em

## 2021-09-11 NOTE — TELEPHONE ENCOUNTER
Spoke to patient and informed to contact us once he has delivery scheduled. Patient verbalized understanding. He stated, he has the office number.

## 2021-10-07 ENCOUNTER — TELEPHONE (OUTPATIENT)
Dept: RHEUMATOLOGY | Facility: CLINIC | Age: 41
End: 2021-10-07

## 2021-10-07 NOTE — TELEPHONE ENCOUNTER
Pt said he never received Claude Basques and wants to know why?  Please advise       Current Outpatient Medications   Medication Sig Dispense Refill   • Certolizumab Pegol (CIMZIA STARTER KIT) 6 X 200 MG/ML Subcutaneous Kit Inject 400 mg into the skin every 14

## 2021-10-07 NOTE — TELEPHONE ENCOUNTER
Spoke to patient and informed to contact pharmacy. 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

## 2021-12-16 RX ORDER — CYCLOBENZAPRINE HCL 5 MG
TABLET ORAL
Qty: 60 TABLET | Refills: 3 | Status: SHIPPED | OUTPATIENT
Start: 2021-12-16

## 2021-12-16 NOTE — TELEPHONE ENCOUNTER
Patient is requesting a refill for cyclobenzaprine 5 MG Oral Tab. Please advise. Patient is out of medication.

## 2021-12-16 NOTE — TELEPHONE ENCOUNTER
Cyclobenzaprine Last refilled 5/14/21  LOV: 8/27/21  Future Appointments   Date Time Provider Navya Petit   12/23/2021 10:40 AM Kristyn Thurman MD [facility] [place]    LABS:  Component      Latest Ref Rng & Units 8/27/2021   Glucose      70 two 300mg at night   5. Return to clinic in 6 weeks.    - he's still taking 2-3 oxycodon in the am

## 2021-12-29 RX ORDER — METHYLPREDNISOLONE 4 MG/1
TABLET ORAL
Qty: 1 EACH | Refills: 0 | Status: CANCELLED | OUTPATIENT
Start: 2021-12-29

## 2022-01-26 ENCOUNTER — TELEPHONE (OUTPATIENT)
Dept: RHEUMATOLOGY | Facility: CLINIC | Age: 42
End: 2022-01-26

## 2022-01-26 NOTE — TELEPHONE ENCOUNTER
LOV: 8/31/2021 pended for [facility] Appointments   Date Time Provider Navya Petit   2/14/2022 10:30 AM Edu Ahumada MD [facility] OF THE [facility]     Labs:   Component      Latest Ref Rng & Units 8/27/2021   Glucose      70 - 99 mg/dL 65 (L)   Sodium      136 - 145 mmol/L 140   Potassium      3.5 - 5.1 mmol/L 4.3   Chloride      98 - 112 mmol/L 104   Carbon Dioxide, Total      21.0 - 32.0 mmol/L 32.0   ANION GAP      0 - 18 mmol/L 4   BUN      7 - 18 mg/dL 14   CREATININE      0.70 - 1.30 mg/dL 1.13   BUN/CREATININE RATIO      10.0 - 20.0 12.4   CALCIUM      8.5 - 10.1 mg/dL 9.6   CALCULATED OSMOLALITY      275 - 295 mOsm/kg 289   eGFR NON-AFR. AMERICAN      >=60 81   eGFR       >=60 93   ALT (SGPT)      16 - 61 U/L 54   AST (SGOT)      15 - 37 U/L 30   ALKALINE PHOSPHATASE      45 - 117 U/L 102   Total Bilirubin      0.1 - 2.0 mg/dL 0.3   PROTEIN, TOTAL      6.4 - 8.2 g/dL 8.5 (H)   Albumin      3.4 - 5.0 g/dL 4.2   Globulin      2.8 - 4.4 g/dL 4.3   A/G Ratio      1.0 - 2.0 1.0   Patient Fasting? No   WBC      4.0 - 11.0 x10(3) uL 7.9   RBC      4.30 - 5.70 x10(6)uL 5.42   Hemoglobin      13.0 - 17.5 g/dL 16.2   Hematocrit      39.0 - 53.0 % 49.2   MCV      80.0 - 100.0 fL 90.8   MCH      26.0 - 34.0 pg 29.9   MCHC      31.0 - 37.0 g/dL 32.9   RDW      11.0 - 15.0 % 13.2   RDW-SD      35.1 - 46.3 fL 43.9   Platelet Count      652.0 - 450.0 10(3)uL 313.0   Quantiferon TB NIL      IU/mL 0.02   Quantiferon-TB1 Minus NIL      IU/mL 0.01   Quantiferon-TB2 Minus NIL      IU/mL 0.00   Quantiferon TB Mitogen minus NIL      IU/mL >10.00   QTB. RESULT      Negative Negative   SED RATE      0 - 15 mm/Hr 11   C-REACTIVE PROTEIN      <0.30 mg/dL <0.29   CK      39 - 308 U/L 270   VITAMIN D, 25-OH, TOTAL      30.0 - 100.0 ng/mL 108.5 (H)

## 2022-02-09 PROBLEM — M45.6 ANKYLOSING SPONDYLITIS OF LUMBAR REGION (HCC): Status: ACTIVE | Noted: 2022-02-09

## 2022-02-21 ENCOUNTER — OFFICE VISIT (OUTPATIENT)
Dept: RHEUMATOLOGY | Facility: CLINIC | Age: 42
End: 2022-02-21
Payer: MEDICAID

## 2022-02-21 VITALS
HEART RATE: 65 BPM | DIASTOLIC BLOOD PRESSURE: 68 MMHG | SYSTOLIC BLOOD PRESSURE: 115 MMHG | BODY MASS INDEX: 26.77 KG/M2 | RESPIRATION RATE: 16 BRPM | WEIGHT: 202 LBS | HEIGHT: 73 IN

## 2022-02-21 DIAGNOSIS — M45.6 ANKYLOSING SPONDYLITIS OF LUMBAR REGION (HCC): Primary | ICD-10-CM

## 2022-02-21 DIAGNOSIS — K29.50 CHRONIC GASTRITIS WITHOUT BLEEDING, UNSPECIFIED GASTRITIS TYPE: ICD-10-CM

## 2022-02-21 DIAGNOSIS — Z51.81 THERAPEUTIC DRUG MONITORING: ICD-10-CM

## 2022-02-21 PROCEDURE — 3074F SYST BP LT 130 MM HG: CPT | Performed by: INTERNAL MEDICINE

## 2022-02-21 PROCEDURE — 3078F DIAST BP <80 MM HG: CPT | Performed by: INTERNAL MEDICINE

## 2022-02-21 PROCEDURE — 99214 OFFICE O/P EST MOD 30 MIN: CPT | Performed by: INTERNAL MEDICINE

## 2022-02-21 PROCEDURE — 3008F BODY MASS INDEX DOCD: CPT | Performed by: INTERNAL MEDICINE

## 2022-02-21 NOTE — PATIENT INSTRUCTIONS
1.  Cont cimzia 400mg every 2 weeks - . finsih loading doses   2. -Cont. alloupurinol 100mga day   3. Return to clinic in 3 months.

## 2022-04-28 ENCOUNTER — HOSPITAL ENCOUNTER (EMERGENCY)
Facility: HOSPITAL | Age: 42
Discharge: HOME | End: 2022-04-28
Attending: EMERGENCY MEDICINE
Payer: MEDICAID

## 2022-04-28 VITALS
HEART RATE: 98 BPM | DIASTOLIC BLOOD PRESSURE: 64 MMHG | SYSTOLIC BLOOD PRESSURE: 109 MMHG | WEIGHT: 195 LBS | TEMPERATURE: 103 F | RESPIRATION RATE: 22 BRPM | OXYGEN SATURATION: 93 % | BODY MASS INDEX: 25.84 KG/M2 | HEIGHT: 73 IN

## 2022-04-28 DIAGNOSIS — U07.1 COVID-19: Primary | ICD-10-CM

## 2022-04-28 LAB
FLUAV + FLUBV RNA SPEC NAA+PROBE: NEGATIVE
FLUAV + FLUBV RNA SPEC NAA+PROBE: NEGATIVE
RSV RNA SPEC NAA+PROBE: NEGATIVE
SARS-COV-2 RNA RESP QL NAA+PROBE: DETECTED

## 2022-04-28 PROCEDURE — 0241U SARS-COV-2/FLU A AND B/RSV BY PCR (GENEXPERT): CPT | Performed by: EMERGENCY MEDICINE

## 2022-04-28 PROCEDURE — 93005 ELECTROCARDIOGRAM TRACING: CPT

## 2022-04-28 PROCEDURE — 99283 EMERGENCY DEPT VISIT LOW MDM: CPT

## 2022-04-28 PROCEDURE — 93010 ELECTROCARDIOGRAM REPORT: CPT | Performed by: EMERGENCY MEDICINE

## 2022-04-28 RX ORDER — ACETAMINOPHEN 500 MG
TABLET ORAL
Status: COMPLETED
Start: 2022-04-28 | End: 2022-04-28

## 2022-04-28 RX ORDER — ACETAMINOPHEN 500 MG
1000 TABLET ORAL ONCE
Status: COMPLETED | OUTPATIENT
Start: 2022-04-28 | End: 2022-04-28

## 2022-04-28 NOTE — ED INITIAL ASSESSMENT (HPI)
Presents with fever and sob, symptoms started about 4 days ago. Pt with generalized pain, dizziness, and sob. Pt took ibuprofen & tylenol, with some relief. Wife and son sick at home, pt reports no one has been tested for covid. No recent travel or covid exposures.

## 2022-05-13 ENCOUNTER — HOSPITAL ENCOUNTER (EMERGENCY)
Facility: HOSPITAL | Age: 42
Discharge: HOME | End: 2022-05-13
Attending: EMERGENCY MEDICINE
Payer: MEDICAID

## 2022-05-13 VITALS
RESPIRATION RATE: 118 BRPM | HEART RATE: 84 BPM | BODY MASS INDEX: 25.18 KG/M2 | HEIGHT: 73 IN | DIASTOLIC BLOOD PRESSURE: 74 MMHG | TEMPERATURE: 100 F | SYSTOLIC BLOOD PRESSURE: 116 MMHG | OXYGEN SATURATION: 99 % | WEIGHT: 190 LBS

## 2022-05-13 DIAGNOSIS — T67.8XXA: Primary | ICD-10-CM

## 2022-05-13 LAB
ANION GAP SERPL CALC-SCNC: 5 MMOL/L (ref 0–18)
BASOPHILS # BLD AUTO: 0.05 X10(3) UL (ref 0–0.2)
BASOPHILS NFR BLD AUTO: 0.4 %
BUN BLD-MCNC: 20 MG/DL (ref 7–18)
BUN/CREAT SERPL: 20.4 (ref 10–20)
CALCIUM BLD-MCNC: 8.8 MG/DL (ref 8.5–10.1)
CHLORIDE SERPL-SCNC: 104 MMOL/L (ref 98–112)
CO2 SERPL-SCNC: 27 MMOL/L (ref 21–32)
CREAT BLD-MCNC: 0.98 MG/DL
DEPRECATED RDW RBC AUTO: 40.8 FL (ref 35.1–46.3)
EOSINOPHIL # BLD AUTO: 0.07 X10(3) UL (ref 0–0.7)
EOSINOPHIL NFR BLD AUTO: 0.5 %
ERYTHROCYTE [DISTWIDTH] IN BLOOD BY AUTOMATED COUNT: 12.4 % (ref 11–15)
GLUCOSE BLD-MCNC: 113 MG/DL (ref 70–99)
HCT VFR BLD AUTO: 46.2 %
HGB BLD-MCNC: 15.7 G/DL
IMM GRANULOCYTES # BLD AUTO: 0.04 X10(3) UL (ref 0–1)
IMM GRANULOCYTES NFR BLD: 0.3 %
LYMPHOCYTES # BLD AUTO: 0.48 X10(3) UL (ref 1–4)
LYMPHOCYTES NFR BLD AUTO: 3.4 %
MCH RBC QN AUTO: 31 PG (ref 26–34)
MCHC RBC AUTO-ENTMCNC: 34 G/DL (ref 31–37)
MCV RBC AUTO: 91.1 FL
MONOCYTES # BLD AUTO: 1.17 X10(3) UL (ref 0.1–1)
MONOCYTES NFR BLD AUTO: 8.2 %
NEUTROPHILS # BLD AUTO: 12.43 X10 (3) UL (ref 1.5–7.7)
NEUTROPHILS # BLD AUTO: 12.43 X10(3) UL (ref 1.5–7.7)
NEUTROPHILS NFR BLD AUTO: 87.2 %
OSMOLALITY SERPL CALC.SUM OF ELEC: 285 MOSM/KG (ref 275–295)
PLATELET # BLD AUTO: 306 10(3)UL (ref 150–450)
POTASSIUM SERPL-SCNC: 4.1 MMOL/L (ref 3.5–5.1)
RBC # BLD AUTO: 5.07 X10(6)UL
SODIUM SERPL-SCNC: 136 MMOL/L (ref 136–145)
WBC # BLD AUTO: 14.2 X10(3) UL (ref 4–11)

## 2022-05-13 PROCEDURE — 99284 EMERGENCY DEPT VISIT MOD MDM: CPT

## 2022-05-13 PROCEDURE — 80048 BASIC METABOLIC PNL TOTAL CA: CPT | Performed by: EMERGENCY MEDICINE

## 2022-05-13 PROCEDURE — 96361 HYDRATE IV INFUSION ADD-ON: CPT

## 2022-05-13 PROCEDURE — 96374 THER/PROPH/DIAG INJ IV PUSH: CPT

## 2022-05-13 PROCEDURE — 85025 COMPLETE CBC W/AUTO DIFF WBC: CPT | Performed by: EMERGENCY MEDICINE

## 2022-05-13 RX ORDER — OXYCODONE HYDROCHLORIDE 5 MG/1
30 TABLET ORAL ONCE
Status: COMPLETED | OUTPATIENT
Start: 2022-05-13 | End: 2022-05-13

## 2022-05-13 RX ORDER — ONDANSETRON 4 MG/1
4 TABLET, ORALLY DISINTEGRATING ORAL EVERY 4 HOURS PRN
Qty: 15 TABLET | Refills: 0 | Status: SHIPPED | OUTPATIENT
Start: 2022-05-13

## 2022-05-13 RX ORDER — ONDANSETRON 2 MG/ML
4 INJECTION INTRAMUSCULAR; INTRAVENOUS ONCE
Status: COMPLETED | OUTPATIENT
Start: 2022-05-13 | End: 2022-05-13

## 2022-05-13 NOTE — ED INITIAL ASSESSMENT (HPI)
Patient arrives ambulatory through triage with complaints of N/V/D since yesterday, dehydration after working outside.    Hx ankylosing spondylitis but cannot take his pain meds due to the N/V.

## 2022-05-23 ENCOUNTER — OFFICE VISIT (OUTPATIENT)
Dept: RHEUMATOLOGY | Facility: CLINIC | Age: 42
End: 2022-05-23
Payer: MEDICAID

## 2022-05-23 VITALS
HEART RATE: 68 BPM | RESPIRATION RATE: 16 BRPM | DIASTOLIC BLOOD PRESSURE: 64 MMHG | WEIGHT: 190 LBS | BODY MASS INDEX: 25.18 KG/M2 | SYSTOLIC BLOOD PRESSURE: 105 MMHG | HEIGHT: 73 IN

## 2022-05-23 DIAGNOSIS — G89.29 CHRONIC LOW BACK PAIN, UNSPECIFIED BACK PAIN LATERALITY, UNSPECIFIED WHETHER SCIATICA PRESENT: ICD-10-CM

## 2022-05-23 DIAGNOSIS — M45.6 ANKYLOSING SPONDYLITIS OF LUMBAR REGION (HCC): Primary | ICD-10-CM

## 2022-05-23 DIAGNOSIS — M79.10 MYALGIA: ICD-10-CM

## 2022-05-23 DIAGNOSIS — Z51.81 THERAPEUTIC DRUG MONITORING: ICD-10-CM

## 2022-05-23 DIAGNOSIS — M54.50 CHRONIC LOW BACK PAIN, UNSPECIFIED BACK PAIN LATERALITY, UNSPECIFIED WHETHER SCIATICA PRESENT: ICD-10-CM

## 2022-05-23 PROCEDURE — 99214 OFFICE O/P EST MOD 30 MIN: CPT | Performed by: INTERNAL MEDICINE

## 2022-05-23 PROCEDURE — 3078F DIAST BP <80 MM HG: CPT | Performed by: INTERNAL MEDICINE

## 2022-05-23 PROCEDURE — 3074F SYST BP LT 130 MM HG: CPT | Performed by: INTERNAL MEDICINE

## 2022-05-23 PROCEDURE — 3008F BODY MASS INDEX DOCD: CPT | Performed by: INTERNAL MEDICINE

## 2022-07-02 RX ORDER — CERTOLIZUMAB PEGOL 200 MG/ML
400 INJECTION, SOLUTION SUBCUTANEOUS
COMMUNITY
Start: 2022-06-20 | End: 2022-07-02

## 2022-07-03 RX ORDER — CERTOLIZUMAB PEGOL 200 MG/ML
400 INJECTION, SOLUTION SUBCUTANEOUS
Qty: 1 EACH | Refills: 5 | Status: SHIPPED | OUTPATIENT
Start: 2022-07-03

## 2022-08-01 RX ORDER — ALLOPURINOL 100 MG/1
100 TABLET ORAL DAILY
Qty: 90 TABLET | Refills: 3 | Status: SHIPPED | OUTPATIENT
Start: 2022-08-01

## 2022-08-01 NOTE — TELEPHONE ENCOUNTER
Patient calling to inquire about prescription refill, patient is leaving out of town tomorrow morning and is looking to get prescription before leaving.

## 2022-08-01 NOTE — TELEPHONE ENCOUNTER
Patient is requesting a refill of ALLOPURINOL 100 MG Oral Tab. Patient states he lost the script and would like it sent to Pharmacy : [facility] [address] in [place].

## 2022-08-01 NOTE — TELEPHONE ENCOUNTER
LOV: 5/23/22  Future Appointments   Date Time Provider Navya Petit   8/24/2022 11:00 AM Jennifer Spears MD [facility]    LABS:  Component      Latest Ref Rng & Units 8/27/2021   Glucose      70 - 99 mg/dL 65 (L)   Sodium      136 - 145 mmol/L 140   Potassium      3.5 - 5.1 mmol/L 4.3   Chloride      98 - 112 mmol/L 104   Carbon Dioxide, Total      21.0 - 32.0 mmol/L 32.0   ANION GAP      0 - 18 mmol/L 4   BUN      7 - 18 mg/dL 14   CREATININE      0.70 - 1.30 mg/dL 1.13   BUN/CREATININE RATIO      10.0 - 20.0 12.4   CALCIUM      8.5 - 10.1 mg/dL 9.6   CALCULATED OSMOLALITY      275 - 295 mOsm/kg 289   eGFR NON-AFR. AMERICAN      >=60 81   eGFR       >=60 93   ALT (SGPT)      16 - 61 U/L 54   AST (SGOT)      15 - 37 U/L 30   ALKALINE PHOSPHATASE      45 - 117 U/L 102   Total Bilirubin      0.1 - 2.0 mg/dL 0.3   PROTEIN, TOTAL      6.4 - 8.2 g/dL 8.5 (H)   Albumin      3.4 - 5.0 g/dL 4.2   Globulin      2.8 - 4.4 g/dL 4.3   A/G Ratio      1.0 - 2.0 1.0   Patient Fasting? No   WBC      4.0 - 11.0 x10(3) uL 7.9   RBC      4.30 - 5.70 x10(6)uL 5.42   Hemoglobin      13.0 - 17.5 g/dL 16.2   Hematocrit      39.0 - 53.0 % 49.2   MCV      80.0 - 100.0 fL 90.8   MCH      26.0 - 34.0 pg 29.9   MCHC      31.0 - 37.0 g/dL 32.9   RDW      11.0 - 15.0 % 13.2   RDW-SD      35.1 - 46.3 fL 43.9   Platelet Count      842.2 - 450.0 10(3)uL 313.0   Quantiferon TB NIL      IU/mL 0.02   Quantiferon-TB1 Minus NIL      IU/mL 0.01   Quantiferon-TB2 Minus NIL      IU/mL 0.00   Quantiferon TB Mitogen minus NIL      IU/mL >10.00   QTB. RESULT      Negative Negative   SED RATE      0 - 15 mm/Hr 11   C-REACTIVE PROTEIN      <0.30 mg/dL <0.29   CK      39 - 308 U/L 270   VITAMIN D, 25-OH, TOTAL      30.0 - 100.0 ng/mL 108.5 (H)     Summary:  1. Cont cimzia 400mg every 2 weeks - . finsih loading doses   2. -Cont. alloupurinol 100mga day   3. Return to clinic in 3 months  4.  Consider enbrel or remicade on next visit if not getting effect with the cimzia    - he's still taking 2-3 oxycodon in the am   - refer to GI for severe gastriits - d/w him that I did not dx him with [facility] - needs GI to eval  - should get a therapist   - he sees pain doctor - for [facility] for his pain meds   - check labs today

## 2022-08-24 ENCOUNTER — OFFICE VISIT (OUTPATIENT)
Dept: RHEUMATOLOGY | Facility: CLINIC | Age: 42
End: 2022-08-24
Payer: MEDICAID

## 2022-08-24 ENCOUNTER — TELEPHONE (OUTPATIENT)
Dept: RHEUMATOLOGY | Facility: CLINIC | Age: 42
End: 2022-08-24

## 2022-08-24 VITALS
HEIGHT: 73 IN | HEART RATE: 71 BPM | DIASTOLIC BLOOD PRESSURE: 80 MMHG | BODY MASS INDEX: 24.65 KG/M2 | SYSTOLIC BLOOD PRESSURE: 124 MMHG | WEIGHT: 186 LBS | RESPIRATION RATE: 16 BRPM

## 2022-08-24 DIAGNOSIS — M1A.09X0 IDIOPATHIC CHRONIC GOUT OF MULTIPLE SITES WITHOUT TOPHUS: ICD-10-CM

## 2022-08-24 DIAGNOSIS — Z51.81 THERAPEUTIC DRUG MONITORING: ICD-10-CM

## 2022-08-24 DIAGNOSIS — K29.50 CHRONIC GASTRITIS WITHOUT BLEEDING, UNSPECIFIED GASTRITIS TYPE: ICD-10-CM

## 2022-08-24 DIAGNOSIS — M45.6 ANKYLOSING SPONDYLITIS OF LUMBAR REGION (HCC): Primary | ICD-10-CM

## 2022-08-24 PROCEDURE — 3079F DIAST BP 80-89 MM HG: CPT | Performed by: INTERNAL MEDICINE

## 2022-08-24 PROCEDURE — 3008F BODY MASS INDEX DOCD: CPT | Performed by: INTERNAL MEDICINE

## 2022-08-24 PROCEDURE — 3074F SYST BP LT 130 MM HG: CPT | Performed by: INTERNAL MEDICINE

## 2022-08-24 PROCEDURE — 99214 OFFICE O/P EST MOD 30 MIN: CPT | Performed by: INTERNAL MEDICINE

## 2022-08-25 RX ORDER — ETANERCEPT 50 MG/ML
50 SOLUTION SUBCUTANEOUS WEEKLY
Qty: 4 EACH | Refills: 5 | Status: SHIPPED | OUTPATIENT
Start: 2022-08-25 | End: 2022-09-24

## 2022-08-25 NOTE — TELEPHONE ENCOUNTER
Dr. Luda Salvador - does patient needs to complete labs prior to sending script? Does he need education?      Fax received from [facility]  PA approved for Enbrel Sure[employer]  4 pens for 28 days   05/26/2022 - 08/24/2023    Case # [id number]

## 2022-08-26 NOTE — TELEPHONE ENCOUNTER
Spoke to patient - name and  verified. He was informed of approval information and offered education. He refused education stating he has no time to go to any class. He was informed, this pen is similar to Humira pen and call us if he has any questions.

## 2022-08-30 ENCOUNTER — LAB ENCOUNTER (OUTPATIENT)
Dept: LAB | Facility: HOSPITAL | Age: 42
End: 2022-08-30
Attending: INTERNAL MEDICINE
Payer: MEDICAID

## 2022-08-30 DIAGNOSIS — M45.6 ANKYLOSING SPONDYLITIS OF LUMBAR REGION (HCC): ICD-10-CM

## 2022-08-30 DIAGNOSIS — Z51.81 THERAPEUTIC DRUG MONITORING: ICD-10-CM

## 2022-08-30 LAB
ALBUMIN SERPL-MCNC: 4.1 G/DL (ref 3.4–5)
ALBUMIN/GLOB SERPL: 1.1 {RATIO} (ref 1–2)
ALP LIVER SERPL-CCNC: 78 U/L
ALT SERPL-CCNC: 37 U/L
ANION GAP SERPL CALC-SCNC: 5 MMOL/L (ref 0–18)
AST SERPL-CCNC: 27 U/L (ref 15–37)
BILIRUB SERPL-MCNC: 0.4 MG/DL (ref 0.1–2)
BUN BLD-MCNC: 19 MG/DL (ref 7–18)
BUN/CREAT SERPL: 16.2 (ref 10–20)
CALCIUM BLD-MCNC: 8.6 MG/DL (ref 8.5–10.1)
CHLORIDE SERPL-SCNC: 103 MMOL/L (ref 98–112)
CO2 SERPL-SCNC: 29 MMOL/L (ref 21–32)
CREAT BLD-MCNC: 1.17 MG/DL
CRP SERPL-MCNC: <0.29 MG/DL (ref ?–0.3)
DEPRECATED RDW RBC AUTO: 40.3 FL (ref 35.1–46.3)
ERYTHROCYTE [DISTWIDTH] IN BLOOD BY AUTOMATED COUNT: 12 % (ref 11–15)
ERYTHROCYTE [SEDIMENTATION RATE] IN BLOOD: 10 MM/HR
FASTING STATUS PATIENT QL REPORTED: NO
GFR SERPLBLD BASED ON 1.73 SQ M-ARVRAT: 80 ML/MIN/1.73M2 (ref 60–?)
GLOBULIN PLAS-MCNC: 3.8 G/DL (ref 2.8–4.4)
GLUCOSE BLD-MCNC: 94 MG/DL (ref 70–99)
HCT VFR BLD AUTO: 47.2 %
HGB BLD-MCNC: 16.5 G/DL
MCH RBC QN AUTO: 32 PG (ref 26–34)
MCHC RBC AUTO-ENTMCNC: 35 G/DL (ref 31–37)
MCV RBC AUTO: 91.7 FL
OSMOLALITY SERPL CALC.SUM OF ELEC: 286 MOSM/KG (ref 275–295)
PLATELET # BLD AUTO: 267 10(3)UL (ref 150–450)
POTASSIUM SERPL-SCNC: 5 MMOL/L (ref 3.5–5.1)
PROT SERPL-MCNC: 7.9 G/DL (ref 6.4–8.2)
RBC # BLD AUTO: 5.15 X10(6)UL
SODIUM SERPL-SCNC: 137 MMOL/L (ref 136–145)
WBC # BLD AUTO: 9.5 X10(3) UL (ref 4–11)

## 2022-08-30 PROCEDURE — 86140 C-REACTIVE PROTEIN: CPT

## 2022-08-30 PROCEDURE — 80053 COMPREHEN METABOLIC PANEL: CPT

## 2022-08-30 PROCEDURE — 85652 RBC SED RATE AUTOMATED: CPT

## 2022-08-30 PROCEDURE — 85027 COMPLETE CBC AUTOMATED: CPT

## 2022-08-30 PROCEDURE — 36415 COLL VENOUS BLD VENIPUNCTURE: CPT

## 2022-08-30 PROCEDURE — 86480 TB TEST CELL IMMUN MEASURE: CPT

## 2022-09-01 LAB
M TB IFN-G CD4+ T-CELLS BLD-ACNC: 0.05 IU/ML
M TB TUBERC IFN-G BLD QL: NEGATIVE
M TB TUBERC IGNF/MITOGEN IGNF CONTROL: >10 IU/ML
QFT TB1 AG MINUS NIL: 0.01 IU/ML
QFT TB2 AG MINUS NIL: 0.01 IU/ML

## 2022-09-02 ENCOUNTER — TELEPHONE (OUTPATIENT)
Dept: RHEUMATOLOGY | Facility: CLINIC | Age: 42
End: 2022-09-02

## 2022-09-02 NOTE — TELEPHONE ENCOUNTER
Patient is requesting a callback to discuss the need for a letter due to walking on concrete in his apartment which is making his condition worser.

## 2022-09-02 NOTE — TELEPHONE ENCOUNTER
Called patient - he states they had sold their house and moved into apartment for temporarily and did a lease for a year. He stated that the apartment first floor has thin layer floor that feels like concrete causing him pain due to his Ankylosing spondylitis. He wants to move to elsewhere, but the apartment is asking them to pay $6000 - $7000 due to braking a lease earlier. He was asked to send pictures through [employer] and informed Dr. Sharon Rizvi is not in office and will respond back in couple of weeks. Please advise.

## 2022-09-08 ENCOUNTER — PATIENT MESSAGE (OUTPATIENT)
Dept: RHEUMATOLOGY | Facility: CLINIC | Age: 42
End: 2022-09-08

## 2022-09-14 NOTE — TELEPHONE ENCOUNTER
Sent message to pt. That not able to write a note like this. I am able to write a note stating his condition.

## 2022-09-24 NOTE — TELEPHONE ENCOUNTER
Dr. Rj Cespedes, please advise on letter for patient health condition. Review attachments and initial message.

## 2022-11-23 ENCOUNTER — LAB ENCOUNTER (OUTPATIENT)
Dept: LAB | Facility: HOSPITAL | Age: 42
End: 2022-11-23
Attending: INTERNAL MEDICINE
Payer: MEDICAID

## 2022-11-23 ENCOUNTER — TELEPHONE (OUTPATIENT)
Dept: RHEUMATOLOGY | Facility: CLINIC | Age: 42
End: 2022-11-23

## 2022-11-23 ENCOUNTER — OFFICE VISIT (OUTPATIENT)
Dept: RHEUMATOLOGY | Facility: CLINIC | Age: 42
End: 2022-11-23
Payer: MEDICAID

## 2022-11-23 VITALS
HEART RATE: 80 BPM | WEIGHT: 194 LBS | HEIGHT: 73 IN | SYSTOLIC BLOOD PRESSURE: 123 MMHG | BODY MASS INDEX: 25.71 KG/M2 | RESPIRATION RATE: 16 BRPM | DIASTOLIC BLOOD PRESSURE: 84 MMHG

## 2022-11-23 DIAGNOSIS — Z51.81 THERAPEUTIC DRUG MONITORING: ICD-10-CM

## 2022-11-23 DIAGNOSIS — M45.6 ANKYLOSING SPONDYLITIS OF LUMBAR REGION (HCC): Primary | ICD-10-CM

## 2022-11-23 DIAGNOSIS — E87.5 HYPERKALEMIA: ICD-10-CM

## 2022-11-23 DIAGNOSIS — M79.10 MYALGIA: ICD-10-CM

## 2022-11-23 LAB
ANION GAP SERPL CALC-SCNC: 3 MMOL/L (ref 0–18)
BUN BLD-MCNC: 13 MG/DL (ref 7–18)
BUN/CREAT SERPL: 12.7 (ref 10–20)
CALCIUM BLD-MCNC: 9.9 MG/DL (ref 8.5–10.1)
CHLORIDE SERPL-SCNC: 103 MMOL/L (ref 98–112)
CO2 SERPL-SCNC: 34 MMOL/L (ref 21–32)
CREAT BLD-MCNC: 1.02 MG/DL
FASTING STATUS PATIENT QL REPORTED: NO
GFR SERPLBLD BASED ON 1.73 SQ M-ARVRAT: 95 ML/MIN/1.73M2 (ref 60–?)
GLUCOSE BLD-MCNC: 90 MG/DL (ref 70–99)
OSMOLALITY SERPL CALC.SUM OF ELEC: 290 MOSM/KG (ref 275–295)
POTASSIUM SERPL-SCNC: 5.6 MMOL/L (ref 3.5–5.1)
SODIUM SERPL-SCNC: 140 MMOL/L (ref 136–145)

## 2022-11-23 PROCEDURE — 3008F BODY MASS INDEX DOCD: CPT | Performed by: INTERNAL MEDICINE

## 2022-11-23 PROCEDURE — 3079F DIAST BP 80-89 MM HG: CPT | Performed by: INTERNAL MEDICINE

## 2022-11-23 PROCEDURE — 36415 COLL VENOUS BLD VENIPUNCTURE: CPT

## 2022-11-23 PROCEDURE — 80048 BASIC METABOLIC PNL TOTAL CA: CPT

## 2022-11-23 PROCEDURE — 3074F SYST BP LT 130 MM HG: CPT | Performed by: INTERNAL MEDICINE

## 2022-11-23 PROCEDURE — 99214 OFFICE O/P EST MOD 30 MIN: CPT | Performed by: INTERNAL MEDICINE

## 2022-11-23 RX ORDER — PREDNISONE 1 MG/1
TABLET ORAL
Qty: 42 TABLET | Refills: 0 | Status: SHIPPED | OUTPATIENT
Start: 2022-11-23

## 2022-11-23 NOTE — PATIENT INSTRUCTIONS
1. Cont. enbrel 50mg every week   2. -Cont. alloupurinol 100mga day   3. Return to clinic in 2  Months  4. Start prednisone after endocrionoligst - then prendisone 5mg - Take 6 tabsx 1 day, take 5 tabsx 1 day, take 4 tabsx 1 day,take 3 tabsx 1 day, take 2 tabsx 1 day, then stay on 5mg a day   5.  Follow up with [facility] -

## 2022-11-23 NOTE — TELEPHONE ENCOUNTER
Lilo Craig from [facility] registration on the phone stating Dr. Barb Cheung has not placed the order for labs required for the pt to complete. Pt is currently at the lab.  Please advise

## 2022-11-25 ENCOUNTER — PATIENT MESSAGE (OUTPATIENT)
Dept: RHEUMATOLOGY | Facility: CLINIC | Age: 42
End: 2022-11-25

## 2022-11-25 DIAGNOSIS — E87.5 HYPERKALEMIA: Primary | ICD-10-CM

## 2022-11-25 DIAGNOSIS — E86.0 DEHYDRATION: ICD-10-CM

## 2022-11-29 NOTE — TELEPHONE ENCOUNTER
Talked to pt. About possibly brenda's - referred to endocrine - has appt.  On 12/5 -   Taking 4 tablespoons a day of kayexlate -   Ordered bmp again

## 2022-12-13 ENCOUNTER — HOSPITAL ENCOUNTER (EMERGENCY)
Age: 42
Discharge: LEFT BEFORE DISCHARGE | End: 2022-12-13
Payer: MEDICAID

## 2022-12-13 VITALS
RESPIRATION RATE: 18 BRPM | SYSTOLIC BLOOD PRESSURE: 126 MMHG | DIASTOLIC BLOOD PRESSURE: 78 MMHG | BODY MASS INDEX: 24.52 KG/M2 | TEMPERATURE: 98 F | HEART RATE: 78 BPM | OXYGEN SATURATION: 96 % | HEIGHT: 73 IN | WEIGHT: 185 LBS

## 2022-12-13 LAB
ALBUMIN SERPL-MCNC: 4.3 G/DL (ref 3.4–5)
ALBUMIN/GLOB SERPL: 1.2 {RATIO} (ref 1–2)
ALP LIVER SERPL-CCNC: 93 U/L
ALT SERPL-CCNC: 79 U/L
ANION GAP SERPL CALC-SCNC: 4 MMOL/L (ref 0–18)
AST SERPL-CCNC: 46 U/L (ref 15–37)
BILIRUB SERPL-MCNC: 0.2 MG/DL (ref 0.1–2)
BUN BLD-MCNC: 16 MG/DL (ref 7–18)
CALCIUM BLD-MCNC: 9.1 MG/DL (ref 8.5–10.1)
CHLORIDE SERPL-SCNC: 102 MMOL/L (ref 98–112)
CO2 SERPL-SCNC: 31 MMOL/L (ref 21–32)
CREAT BLD-MCNC: 0.98 MG/DL
GFR SERPLBLD BASED ON 1.73 SQ M-ARVRAT: 99 ML/MIN/1.73M2 (ref 60–?)
GLOBULIN PLAS-MCNC: 3.5 G/DL (ref 2.8–4.4)
GLUCOSE BLD-MCNC: 91 MG/DL (ref 70–99)
OSMOLALITY SERPL CALC.SUM OF ELEC: 285 MOSM/KG (ref 275–295)
POTASSIUM SERPL-SCNC: 4.6 MMOL/L (ref 3.5–5.1)
PROT SERPL-MCNC: 7.8 G/DL (ref 6.4–8.2)
SODIUM SERPL-SCNC: 137 MMOL/L (ref 136–145)

## 2022-12-13 PROCEDURE — 80053 COMPREHEN METABOLIC PANEL: CPT

## 2022-12-14 NOTE — ED INITIAL ASSESSMENT (HPI)
Pt to ed with concerns for hyperkalemia, pt is being worked up for Alfonso disease, per PT he has been unable to get his RX of kayexalate  filled.

## 2022-12-16 ENCOUNTER — PATIENT MESSAGE (OUTPATIENT)
Dept: RHEUMATOLOGY | Facility: CLINIC | Age: 42
End: 2022-12-16

## 2022-12-16 DIAGNOSIS — E87.5 HYPERKALEMIA: Primary | ICD-10-CM

## 2022-12-16 NOTE — TELEPHONE ENCOUNTER
Please see pt's message below and advise. Message sent to pt to contact PCP to manage potassium levels.

## 2022-12-19 RX ORDER — METHYLPREDNISOLONE 4 MG/1
TABLET ORAL
Qty: 1 EACH | Refills: 0 | Status: SHIPPED | OUTPATIENT
Start: 2022-12-19 | End: 2022-12-21

## 2022-12-21 ENCOUNTER — TELEPHONE (OUTPATIENT)
Dept: RHEUMATOLOGY | Facility: CLINIC | Age: 42
End: 2022-12-21

## 2022-12-21 RX ORDER — ALLOPURINOL 100 MG/1
100 TABLET ORAL DAILY
Qty: 90 TABLET | Refills: 1 | Status: SHIPPED | OUTPATIENT
Start: 2022-12-21

## 2022-12-21 RX ORDER — ALLOPURINOL 100 MG/1
100 TABLET ORAL DAILY
Qty: 90 TABLET | Refills: 1 | Status: SHIPPED | OUTPATIENT
Start: 2022-12-21 | End: 2022-12-21

## 2022-12-21 RX ORDER — METHYLPREDNISOLONE 4 MG/1
TABLET ORAL
Qty: 1 EACH | Refills: 0 | Status: SHIPPED | OUTPATIENT
Start: 2022-12-21

## 2022-12-21 NOTE — TELEPHONE ENCOUNTER
Please see below and advise. Pt reports he does not have a PCP because he just moved. He reports he is starting with the same symptoms.

## 2022-12-21 NOTE — TELEPHONE ENCOUNTER
Patient states he believe his potassium and cortisol levels are elevated and is requesting to have labs done and meds if elevated, requesting call back to discuss

## 2022-12-22 NOTE — TELEPHONE ENCOUNTER
Talked to pt. -   He had left foot pain - which is new -   He did start the medrol jovanni. He has a new pharmacy - sent in allopurinol 100mg a day and another medrol jovanni to do extended -   He is going to get a new pcp.

## 2023-03-24 ENCOUNTER — TELEPHONE (OUTPATIENT)
Dept: RHEUMATOLOGY | Facility: CLINIC | Age: 43
End: 2023-03-24

## 2023-03-24 NOTE — TELEPHONE ENCOUNTER
Please see below. Pt stated he has been off of Enbrel for the week. Reported his gout is flaring on allopurinol. Bilateral wrist pain and swelling (was on prednisone). He does not feel well. He has emergency surgery for left nasal abscesses per notes, culture showed MRSA, was hospitalized at [address] [place] [address]. Not sure what to take for pain at this time. Please advise. Pt advised not to take Enbrel at all at this time.

## 2023-03-24 NOTE — TELEPHONE ENCOUNTER
Talked to pt. -   He feels his wrists are hurting and wrists swell - he got prednisone but then got facial celluitis MRSA - the swelling is down but still   He's just picking up celebrex. He has to take the abx for 7 days -   Told him not to take prednisone or enbrel - needs to finishe abx. He took ibuprofen this morning. So he is ok to take the celebrex. He was feeling much better with his inflamamtion - and was off his pain meds -   So he was doing better. He is hurting again now. He feels the vitamins and enbrel may have been helping him. He feels stress was better managed.

## 2023-03-24 NOTE — TELEPHONE ENCOUNTER
Per patient he had a surgery 03/21/2023 due to an infection that went t his head and took it out his Enbrel and patient need to come and see doctor for follow up. No soon available appointment for both location. Please advise, Thank you.

## 2023-04-03 RX ORDER — MEDROXYPROGESTERONE ACETATE 150 MG/ML
50 INJECTION, SUSPENSION INTRAMUSCULAR
COMMUNITY
Start: 2023-03-20 | End: 2023-04-03

## 2023-04-03 RX ORDER — MEDROXYPROGESTERONE ACETATE 150 MG/ML
50 INJECTION, SUSPENSION INTRAMUSCULAR
Qty: 4 EACH | Refills: 5 | Status: SHIPPED | OUTPATIENT
Start: 2023-04-03

## 2023-04-12 ENCOUNTER — TELEMEDICINE (OUTPATIENT)
Dept: RHEUMATOLOGY | Facility: CLINIC | Age: 43
End: 2023-04-12

## 2023-04-12 DIAGNOSIS — M45.6 ANKYLOSING SPONDYLITIS OF LUMBAR REGION (HCC): Primary | ICD-10-CM

## 2023-04-12 DIAGNOSIS — Z79.620 LONG TERM (CURRENT) USE OF IMMUNOSUPPRESSIVE BIOLOGIC: ICD-10-CM

## 2023-04-12 DIAGNOSIS — M1A.09X0 IDIOPATHIC CHRONIC GOUT OF MULTIPLE SITES WITHOUT TOPHUS: ICD-10-CM

## 2023-04-12 DIAGNOSIS — E87.5 HYPERKALEMIA: ICD-10-CM

## 2023-04-12 DIAGNOSIS — R53.83 FATIGUE, UNSPECIFIED TYPE: ICD-10-CM

## 2023-04-12 DIAGNOSIS — E86.0 DEHYDRATION: ICD-10-CM

## 2023-04-12 PROCEDURE — 99214 OFFICE O/P EST MOD 30 MIN: CPT | Performed by: INTERNAL MEDICINE

## 2023-04-15 ENCOUNTER — LAB ENCOUNTER (OUTPATIENT)
Dept: LAB | Facility: HOSPITAL | Age: 43
End: 2023-04-15
Attending: INTERNAL MEDICINE
Payer: MEDICAID

## 2023-04-15 DIAGNOSIS — E86.0 DEHYDRATION: ICD-10-CM

## 2023-04-15 DIAGNOSIS — E87.5 HYPERKALEMIA: ICD-10-CM

## 2023-04-15 DIAGNOSIS — R53.83 FATIGUE, UNSPECIFIED TYPE: ICD-10-CM

## 2023-04-15 DIAGNOSIS — M45.6 ANKYLOSING SPONDYLITIS OF LUMBAR REGION (HCC): ICD-10-CM

## 2023-04-15 DIAGNOSIS — M1A.09X0 IDIOPATHIC CHRONIC GOUT OF MULTIPLE SITES WITHOUT TOPHUS: ICD-10-CM

## 2023-04-15 LAB
ANION GAP SERPL CALC-SCNC: 4 MMOL/L (ref 0–18)
BUN BLD-MCNC: 16 MG/DL (ref 7–18)
BUN/CREAT SERPL: 15 (ref 10–20)
CALCIUM BLD-MCNC: 9.3 MG/DL (ref 8.5–10.1)
CHLORIDE SERPL-SCNC: 104 MMOL/L (ref 98–112)
CO2 SERPL-SCNC: 29 MMOL/L (ref 21–32)
CORTIS SERPL-MCNC: 8.3 UG/DL
CREAT BLD-MCNC: 1.07 MG/DL
CRP SERPL-MCNC: <0.29 MG/DL (ref ?–0.3)
ERYTHROCYTE [SEDIMENTATION RATE] IN BLOOD: 3 MM/HR
FASTING STATUS PATIENT QL REPORTED: NO
GFR SERPLBLD BASED ON 1.73 SQ M-ARVRAT: 89 ML/MIN/1.73M2 (ref 60–?)
GLUCOSE BLD-MCNC: 97 MG/DL (ref 70–99)
OSMOLALITY SERPL CALC.SUM OF ELEC: 285 MOSM/KG (ref 275–295)
POTASSIUM SERPL-SCNC: 4.3 MMOL/L (ref 3.5–5.1)
SODIUM SERPL-SCNC: 137 MMOL/L (ref 136–145)
URATE SERPL-MCNC: 7.4 MG/DL

## 2023-04-15 PROCEDURE — 84550 ASSAY OF BLOOD/URIC ACID: CPT

## 2023-04-15 PROCEDURE — 85652 RBC SED RATE AUTOMATED: CPT

## 2023-04-15 PROCEDURE — 80048 BASIC METABOLIC PNL TOTAL CA: CPT

## 2023-04-15 PROCEDURE — 36415 COLL VENOUS BLD VENIPUNCTURE: CPT

## 2023-04-15 PROCEDURE — 82533 TOTAL CORTISOL: CPT

## 2023-04-15 PROCEDURE — 86140 C-REACTIVE PROTEIN: CPT

## 2023-05-09 ENCOUNTER — TELEPHONE (OUTPATIENT)
Dept: RHEUMATOLOGY | Facility: CLINIC | Age: 43
End: 2023-05-09

## 2023-05-09 RX ORDER — OMEPRAZOLE 40 MG/1
40 CAPSULE, DELAYED RELEASE ORAL DAILY
Qty: 90 CAPSULE | Refills: 0 | Status: SHIPPED | OUTPATIENT
Start: 2023-05-09

## 2023-05-09 NOTE — TELEPHONE ENCOUNTER
Patient is calling to ask if Dr. Frank Fournier is able to prescribe this one time only the Omeprazole. Patient mentions his PCP use to prescribe it for acid reflux put this provider is not responding and unable to get a hold of anyone to request.  Patient has not scheduled an appointment with a new PCP yet. Please advise.

## 2023-05-26 ENCOUNTER — MED REC SCAN ONLY (OUTPATIENT)
Dept: RHEUMATOLOGY | Facility: CLINIC | Age: 43
End: 2023-05-26

## 2023-06-29 ENCOUNTER — HOSPITAL ENCOUNTER (EMERGENCY)
Age: 43
Discharge: HOME | End: 2023-06-29
Attending: EMERGENCY MEDICINE
Payer: MEDICAID

## 2023-06-29 ENCOUNTER — APPOINTMENT (OUTPATIENT)
Dept: GENERAL RADIOLOGY | Age: 43
End: 2023-06-29
Attending: EMERGENCY MEDICINE
Payer: MEDICAID

## 2023-06-29 VITALS
TEMPERATURE: 99 F | OXYGEN SATURATION: 99 % | SYSTOLIC BLOOD PRESSURE: 141 MMHG | RESPIRATION RATE: 18 BRPM | HEART RATE: 76 BPM | WEIGHT: 180 LBS | DIASTOLIC BLOOD PRESSURE: 87 MMHG | HEIGHT: 73 IN | BODY MASS INDEX: 23.86 KG/M2

## 2023-06-29 DIAGNOSIS — L03.116 CELLULITIS OF LEFT LOWER EXTREMITY: Primary | ICD-10-CM

## 2023-06-29 DIAGNOSIS — M11.20 PSEUDOGOUT: ICD-10-CM

## 2023-06-29 LAB
ANION GAP SERPL CALC-SCNC: 1 MMOL/L (ref 0–18)
BASOPHILS # BLD AUTO: 0.04 X10(3) UL (ref 0–0.2)
BASOPHILS NFR BLD AUTO: 0.4 %
BASOPHILS NFR SNV: 0 %
BUN BLD-MCNC: 13 MG/DL (ref 7–18)
CALCIUM BLD-MCNC: 8.4 MG/DL (ref 8.5–10.1)
CHLORIDE SERPL-SCNC: 105 MMOL/L (ref 98–112)
CO2 SERPL-SCNC: 29 MMOL/L (ref 21–32)
CREAT BLD-MCNC: 0.96 MG/DL
EOSINOPHIL # BLD AUTO: 0.25 X10(3) UL (ref 0–0.7)
EOSINOPHIL NFR BLD AUTO: 2.3 %
EOSINOPHIL NFR SNV: 0 %
ERYTHROCYTE [DISTWIDTH] IN BLOOD BY AUTOMATED COUNT: 13.1 %
ERYTHROCYTE [SEDIMENTATION RATE] IN BLOOD: 5 MM/HR
GFR SERPLBLD BASED ON 1.73 SQ M-ARVRAT: 102 ML/MIN/1.73M2 (ref 60–?)
GLUCOSE BLD-MCNC: 98 MG/DL (ref 70–99)
HCT VFR BLD AUTO: 46 %
HGB BLD-MCNC: 15.6 G/DL
IMM GRANULOCYTES # BLD AUTO: 0.02 X10(3) UL (ref 0–1)
IMM GRANULOCYTES NFR BLD: 0.2 %
LYMPHOCYTES # BLD AUTO: 1.93 X10(3) UL (ref 1–4)
LYMPHOCYTES NFR BLD AUTO: 18.1 %
LYMPHOCYTES NFR SNV: 10 %
MCH RBC QN AUTO: 31.3 PG (ref 26–34)
MCHC RBC AUTO-ENTMCNC: 33.9 G/DL (ref 31–37)
MCV RBC AUTO: 92.2 FL
MONOCYTES # BLD AUTO: 1.25 X10(3) UL (ref 0.1–1)
MONOCYTES NFR BLD AUTO: 11.7 %
MONOS+MACROS NFR SNV: 11 %
NEUTROPHILS # BLD AUTO: 7.19 X10 (3) UL (ref 1.5–7.7)
NEUTROPHILS # BLD AUTO: 7.19 X10(3) UL (ref 1.5–7.7)
NEUTROPHILS NFR BLD AUTO: 67.3 %
NEUTROPHILS NFR SNV: 79 %
OSMOLALITY SERPL CALC.SUM OF ELEC: 280 MOSM/KG (ref 275–295)
PLATELET # BLD AUTO: 264 10(3)UL (ref 150–450)
POTASSIUM SERPL-SCNC: 4 MMOL/L (ref 3.5–5.1)
RBC # BLD AUTO: 4.99 X10(6)UL
SODIUM SERPL-SCNC: 135 MMOL/L (ref 136–145)
TOTAL CELLS COUNTED FLD: 100
WBC # BLD AUTO: 10.7 X10(3) UL (ref 4–11)

## 2023-06-29 PROCEDURE — 99285 EMERGENCY DEPT VISIT HI MDM: CPT

## 2023-06-29 PROCEDURE — 87147 CULTURE TYPE IMMUNOLOGIC: CPT | Performed by: EMERGENCY MEDICINE

## 2023-06-29 PROCEDURE — 85652 RBC SED RATE AUTOMATED: CPT | Performed by: EMERGENCY MEDICINE

## 2023-06-29 PROCEDURE — 87205 SMEAR GRAM STAIN: CPT | Performed by: EMERGENCY MEDICINE

## 2023-06-29 PROCEDURE — 73562 X-RAY EXAM OF KNEE 3: CPT | Performed by: EMERGENCY MEDICINE

## 2023-06-29 PROCEDURE — 87186 SC STD MICRODIL/AGAR DIL: CPT | Performed by: EMERGENCY MEDICINE

## 2023-06-29 PROCEDURE — 87070 CULTURE OTHR SPECIMN AEROBIC: CPT | Performed by: EMERGENCY MEDICINE

## 2023-06-29 PROCEDURE — 89050 BODY FLUID CELL COUNT: CPT | Performed by: EMERGENCY MEDICINE

## 2023-06-29 PROCEDURE — 20610 DRAIN/INJ JOINT/BURSA W/O US: CPT

## 2023-06-29 PROCEDURE — 96366 THER/PROPH/DIAG IV INF ADDON: CPT

## 2023-06-29 PROCEDURE — 80048 BASIC METABOLIC PNL TOTAL CA: CPT | Performed by: EMERGENCY MEDICINE

## 2023-06-29 PROCEDURE — 85025 COMPLETE CBC W/AUTO DIFF WBC: CPT | Performed by: EMERGENCY MEDICINE

## 2023-06-29 PROCEDURE — 89060 EXAM SYNOVIAL FLUID CRYSTALS: CPT | Performed by: EMERGENCY MEDICINE

## 2023-06-29 PROCEDURE — 96375 TX/PRO/DX INJ NEW DRUG ADDON: CPT

## 2023-06-29 PROCEDURE — 96365 THER/PROPH/DIAG IV INF INIT: CPT

## 2023-06-29 RX ORDER — KETOROLAC TROMETHAMINE 15 MG/ML
15 INJECTION, SOLUTION INTRAMUSCULAR; INTRAVENOUS ONCE
Status: COMPLETED | OUTPATIENT
Start: 2023-06-29 | End: 2023-06-29

## 2023-06-29 RX ORDER — COLCHICINE 0.6 MG/1
0.6 CAPSULE ORAL 2 TIMES DAILY PRN
Qty: 20 CAPSULE | Refills: 0 | Status: SHIPPED | OUTPATIENT
Start: 2023-06-29 | End: 2023-07-09

## 2023-06-29 RX ORDER — CLINDAMYCIN HYDROCHLORIDE 300 MG/1
300 CAPSULE ORAL 3 TIMES DAILY
Qty: 30 CAPSULE | Refills: 0 | Status: SHIPPED | OUTPATIENT
Start: 2023-06-29 | End: 2023-07-09

## 2023-06-29 RX ORDER — MORPHINE SULFATE 4 MG/ML
4 INJECTION, SOLUTION INTRAMUSCULAR; INTRAVENOUS ONCE
Status: COMPLETED | OUTPATIENT
Start: 2023-06-29 | End: 2023-06-29

## 2023-06-29 RX ORDER — NAPROXEN 500 MG/1
500 TABLET ORAL 2 TIMES DAILY PRN
Qty: 20 TABLET | Refills: 0 | Status: SHIPPED | OUTPATIENT
Start: 2023-06-29 | End: 2023-07-06

## 2023-06-29 NOTE — DISCHARGE INSTRUCTIONS
Pseudogout (MIREYA-wilson-gout) is a form of arthritis characterized by sudden, painful swelling in one or more of the joints. Episodes can last for days or weeks. Pseudogout is formally known as calcium pyrophosphate deposition disease or CPPD. But the condition is commonly called pseudogout because of its similarity to gout. In both pseudogout and gout, crystal deposits form within a joint, although the type of crystal differs for each condition. It isn't clear why crystals form in joints and cause pseudogout, but the risk increases with age. Treatments can help relieve pain and reduce inflammation. Products & Services  Book: [facility] Guide to Arthritis  Show more products from [facility]  Symptoms  Pseudogout most commonly affects the knees. Less often, it affects the wrists and ankles. When a pseudogout attack occurs, the affected joints are usually:    Swollen  Warm  Severely painful  When to see a doctor  Seek medical attention if you have sudden, intense joint pain and swelling. Causes  Pseudogout has been linked to the presence of calcium pyrophosphate dihydrate crystals within the affected joint. These crystals become more numerous as people age, appearing in nearly half the population older than age 80. But most people who have these crystal deposits never develop pseudogout. It's not clear why some people have symptoms and others don't. Risk factors  Factors that can increase your risk of pseudogout include:    Older age. The risk of developing pseudogout increases with age. Joint trauma. Trauma to a joint, such as a serious injury or surgery, increases the risk of pseudogout in that joint. Genetic disorder. In some families, family members have a hereditary tendency to develop pseudogout. These people tend to develop pseudogout at younger ages. Mineral imbalances. The risk of pseudogout is higher for people who have excessive calcium or iron in their blood or too little magnesium.   Other medical conditions. Pseudogout has also been linked to an underactive thyroid gland or an overactive parathyroid gland. Complications  The crystal deposits associated with pseudogout can also cause joint damage, which can mimic the signs and symptoms of osteoarthritis or rheumatoid arthritis. Treatment  There's no cure for pseudogout, but a combination of treatments can help relieve pain and improve the joint's function. Medications  If over-the-counter pain relievers aren't enough, your health care provider may suggest:    Nonsteroidal anti-inflammatory drugs (NSAIDs). Prescription strength NSAIDs include naproxen (Naprosyn) and indomethacin (Indocin). NSAIDs can cause stomach bleeding and decreased kidney function, especially in older adults. Colchicine (Izella Honey). Low-dose pills of this gout drug are also effective for pseudogout. If you have frequent episodes of pseudogout, you may be advised to take colchicine daily as a preventive measure. Corticosteroids. If you can't take NSAIDs or colchicine, your health care provider may suggest taking corticosteroid pills, such as prednisone, to reduce inflammation and end the attack. Long-term use of corticosteroids can weaken bones and cause cataracts, diabetes and weight gain. Joint drainage  Removing some of the joint fluid can relieve pain and pressure in an affected joint. A needle is used to remove the fluid. The process also helps remove some of the crystals from the joint. The joint is then injected with a numbing medication and a corticosteroid to decrease inflammation.

## 2023-06-30 LAB
CRYSTALS SNV QL MICRO: POSITIVE
GRANULOCYTES # SNV AUTO: 649 /MM3 (ref 0–200)
RBC # FLD AUTO: ABNORMAL /MM3 (ref ?–1)

## 2023-07-02 ENCOUNTER — APPOINTMENT (OUTPATIENT)
Dept: GENERAL RADIOLOGY | Facility: HOSPITAL | Age: 43
End: 2023-07-02
Attending: NURSE PRACTITIONER
Payer: MEDICAID

## 2023-07-02 ENCOUNTER — HOSPITAL ENCOUNTER (EMERGENCY)
Facility: HOSPITAL | Age: 43
Discharge: HOME | End: 2023-07-02
Payer: MEDICAID

## 2023-07-02 VITALS
DIASTOLIC BLOOD PRESSURE: 78 MMHG | BODY MASS INDEX: 24 KG/M2 | TEMPERATURE: 99 F | SYSTOLIC BLOOD PRESSURE: 124 MMHG | WEIGHT: 179.88 LBS | HEART RATE: 71 BPM | OXYGEN SATURATION: 95 % | RESPIRATION RATE: 22 BRPM

## 2023-07-02 DIAGNOSIS — L03.116 CELLULITIS OF LEFT LOWER EXTREMITY: ICD-10-CM

## 2023-07-02 DIAGNOSIS — Z22.322 MRSA (METHICILLIN RESISTANT STAPH AUREUS) CULTURE POSITIVE: Primary | ICD-10-CM

## 2023-07-02 LAB
ALBUMIN SERPL-MCNC: 3.8 G/DL (ref 3.4–5)
ALBUMIN/GLOB SERPL: 1.1 {RATIO} (ref 1–2)
ALP LIVER SERPL-CCNC: 65 U/L
ALT SERPL-CCNC: 28 U/L
ANION GAP SERPL CALC-SCNC: 4 MMOL/L (ref 0–18)
AST SERPL-CCNC: 25 U/L (ref 15–37)
BASOPHILS # BLD AUTO: 0.05 X10(3) UL (ref 0–0.2)
BASOPHILS NFR BLD AUTO: 0.6 %
BILIRUB SERPL-MCNC: 0.3 MG/DL (ref 0.1–2)
BUN BLD-MCNC: 9 MG/DL (ref 7–18)
BUN/CREAT SERPL: 9.5 (ref 10–20)
CALCIUM BLD-MCNC: 8.7 MG/DL (ref 8.5–10.1)
CHLORIDE SERPL-SCNC: 106 MMOL/L (ref 98–112)
CO2 SERPL-SCNC: 29 MMOL/L (ref 21–32)
CREAT BLD-MCNC: 0.95 MG/DL
CRP SERPL-MCNC: 1.63 MG/DL (ref ?–0.3)
DEPRECATED RDW RBC AUTO: 44.2 FL (ref 35.1–46.3)
EOSINOPHIL # BLD AUTO: 0.24 X10(3) UL (ref 0–0.7)
EOSINOPHIL NFR BLD AUTO: 3.1 %
ERYTHROCYTE [DISTWIDTH] IN BLOOD BY AUTOMATED COUNT: 12.9 % (ref 11–15)
ERYTHROCYTE [SEDIMENTATION RATE] IN BLOOD: 21 MM/HR
GFR SERPLBLD BASED ON 1.73 SQ M-ARVRAT: 103 ML/MIN/1.73M2 (ref 60–?)
GLOBULIN PLAS-MCNC: 3.6 G/DL (ref 2.8–4.4)
GLUCOSE BLD-MCNC: 113 MG/DL (ref 70–99)
HCT VFR BLD AUTO: 43.6 %
HGB BLD-MCNC: 14.7 G/DL
IMM GRANULOCYTES # BLD AUTO: 0.01 X10(3) UL (ref 0–1)
IMM GRANULOCYTES NFR BLD: 0.1 %
LYMPHOCYTES # BLD AUTO: 2.06 X10(3) UL (ref 1–4)
LYMPHOCYTES NFR BLD AUTO: 26.3 %
MCH RBC QN AUTO: 31.6 PG (ref 26–34)
MCHC RBC AUTO-ENTMCNC: 33.7 G/DL (ref 31–37)
MCV RBC AUTO: 93.8 FL
MONOCYTES # BLD AUTO: 0.56 X10(3) UL (ref 0.1–1)
MONOCYTES NFR BLD AUTO: 7.1 %
NEUTROPHILS # BLD AUTO: 4.92 X10 (3) UL (ref 1.5–7.7)
NEUTROPHILS # BLD AUTO: 4.92 X10(3) UL (ref 1.5–7.7)
NEUTROPHILS NFR BLD AUTO: 62.8 %
OSMOLALITY SERPL CALC.SUM OF ELEC: 287 MOSM/KG (ref 275–295)
PLATELET # BLD AUTO: 286 10(3)UL (ref 150–450)
POTASSIUM SERPL-SCNC: 4.5 MMOL/L (ref 3.5–5.1)
PROT SERPL-MCNC: 7.4 G/DL (ref 6.4–8.2)
RBC # BLD AUTO: 4.65 X10(6)UL
SODIUM SERPL-SCNC: 139 MMOL/L (ref 136–145)
WBC # BLD AUTO: 7.8 X10(3) UL (ref 4–11)

## 2023-07-02 PROCEDURE — 86140 C-REACTIVE PROTEIN: CPT | Performed by: NURSE PRACTITIONER

## 2023-07-02 PROCEDURE — 73560 X-RAY EXAM OF KNEE 1 OR 2: CPT | Performed by: NURSE PRACTITIONER

## 2023-07-02 PROCEDURE — 99285 EMERGENCY DEPT VISIT HI MDM: CPT

## 2023-07-02 PROCEDURE — 85025 COMPLETE CBC W/AUTO DIFF WBC: CPT | Performed by: NURSE PRACTITIONER

## 2023-07-02 PROCEDURE — 80053 COMPREHEN METABOLIC PANEL: CPT | Performed by: NURSE PRACTITIONER

## 2023-07-02 PROCEDURE — 36415 COLL VENOUS BLD VENIPUNCTURE: CPT

## 2023-07-02 PROCEDURE — 85652 RBC SED RATE AUTOMATED: CPT | Performed by: NURSE PRACTITIONER

## 2023-07-02 PROCEDURE — 99283 EMERGENCY DEPT VISIT LOW MDM: CPT

## 2023-07-02 NOTE — ED INITIAL ASSESSMENT (HPI)
Patient ambulatory to ED with complaint of R leg pain and infection. Sent over due to positive MRSA, abx and admission. Cms intact. Patient is AXOX4.

## 2023-07-02 NOTE — ED NOTES
Culture results noted, discussed case with Dr. Rosa M Byrnes - patient to report to ER for further eval and probable admission - discussed with patient. Patient reports he may not get to the hospital \"until later\" because he has his daughter. Expressed the importance of making arrangements to report to hospital as soon as possible. Patient verbalized understanding.

## 2023-07-02 NOTE — ED NOTES
Patient calling expressing concerns about not being admitted to the hospital - Discussed case with Tobin MUHAMMAD at length. Per kelby Salomon visit determined patient knee aspirate was probable contaminate from skin per specialist, patient is to continue currently antibiotic therapy, closely monitor site and follow up closely with ortho this week. Patient informed to utilize  if he has trouble making follow up appointment with ortho. Stressed importance of close monitoring and follow up. Patient verbalized understanding and seemed less anxious about outpatient treatment.

## 2023-08-03 RX ORDER — OMEPRAZOLE 40 MG/1
40 CAPSULE, DELAYED RELEASE ORAL DAILY
Qty: 90 CAPSULE | Refills: 3 | Status: SHIPPED | OUTPATIENT
Start: 2023-08-03

## 2023-08-03 NOTE — TELEPHONE ENCOUNTER
Disp Refills Start End    Omeprazole 40 MG Oral Capsule Delayed Release 90 capsule 0 5/9/2023    LOV: virtual- 4/12/23  No future appointments. Labs: 4/15/23  Summary:  1. Cont. enbrel 50mg every week   2. -Cont. alloupurinol 100mga day   3. Check labs   4. Refer to  [facility] - [phone] - eval for brenda's -  5. Return to clinic in 2-3 months.    - he's still taking 2-3 oxycodon in the am   - refer to GI for severe gastriits - d/w him that I did not dx him with [facility] - needs GI to eval  - should get a therapist   - he sees pain doctor - for [facility] for his pain meds   -   - check labs today   Brinda Shaikh MD  11/23/2022   11:42 AM

## 2023-08-15 ENCOUNTER — TELEPHONE (OUTPATIENT)
Dept: RHEUMATOLOGY | Facility: CLINIC | Age: 43
End: 2023-08-15

## 2023-08-24 NOTE — TELEPHONE ENCOUNTER
Initiated PA via [employer] coverage [employer] ([facility] [employer]). [employer] response was Prior Authorization not required for patient/medication; medication available without authorization.

## 2023-09-05 ENCOUNTER — HOSPITAL ENCOUNTER (EMERGENCY)
Age: 43
Discharge: LEFT BEFORE DISCHARGE | End: 2023-09-05
Payer: MEDICAID

## 2023-09-16 RX ORDER — ALLOPURINOL 100 MG/1
100 TABLET ORAL DAILY
Qty: 90 TABLET | Refills: 2 | Status: SHIPPED | OUTPATIENT
Start: 2023-09-16

## 2023-11-27 ENCOUNTER — HOSPITAL ENCOUNTER (EMERGENCY)
Age: 43
Discharge: HOME | End: 2023-11-27
Attending: EMERGENCY MEDICINE
Payer: MEDICARE

## 2023-11-27 VITALS
BODY MASS INDEX: 24.52 KG/M2 | HEART RATE: 94 BPM | TEMPERATURE: 99 F | RESPIRATION RATE: 16 BRPM | HEIGHT: 73 IN | WEIGHT: 185 LBS | SYSTOLIC BLOOD PRESSURE: 136 MMHG | DIASTOLIC BLOOD PRESSURE: 84 MMHG | OXYGEN SATURATION: 98 %

## 2023-11-27 DIAGNOSIS — S16.1XXA STRAIN OF NECK MUSCLE, INITIAL ENCOUNTER: ICD-10-CM
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
## 2023-11-27 PROCEDURE — 99284 EMERGENCY DEPT VISIT MOD MDM: CPT

## 2023-11-27 PROCEDURE — 99283 EMERGENCY DEPT VISIT LOW MDM: CPT

## 2023-11-27 RX ORDER — CYCLOBENZAPRINE HCL 10 MG
10 TABLET ORAL 3 TIMES DAILY PRN
Qty: 20 TABLET | Refills: 0 | Status: SHIPPED | OUTPATIENT
Start: 2023-11-27 | End: 2023-12-07

## 2023-11-27 RX ORDER — ALLOPURINOL 100 MG/1
100 TABLET ORAL DAILY
COMMUNITY
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
## 2024-01-25 ENCOUNTER — APPOINTMENT (OUTPATIENT)
Dept: GENERAL RADIOLOGY | Age: 44
End: 2024-01-25
Attending: EMERGENCY MEDICINE
Payer: MEDICARE

## 2024-01-25 ENCOUNTER — ANESTHESIA EVENT (OUTPATIENT)
Dept: SURGERY | Facility: HOSPITAL | Age: 44
End: 2024-01-25
Payer: MEDICARE

## 2024-01-25 ENCOUNTER — APPOINTMENT (OUTPATIENT)
Dept: CT IMAGING | Age: 44
End: 2024-01-25
Attending: EMERGENCY MEDICINE
Payer: MEDICARE

## 2024-01-25 ENCOUNTER — HOSPITAL ENCOUNTER (INPATIENT)
Facility: HOSPITAL | Age: 44
LOS: 2 days | Discharge: HOME | End: 2024-01-27
Attending: EMERGENCY MEDICINE | Admitting: HOSPITALIST
Payer: MEDICARE

## 2024-01-25 ENCOUNTER — APPOINTMENT (OUTPATIENT)
Dept: MRI IMAGING | Age: 44
End: 2024-01-25
Attending: EMERGENCY MEDICINE
Payer: MEDICARE

## 2024-01-25 ENCOUNTER — ANESTHESIA (OUTPATIENT)
Dept: SURGERY | Facility: HOSPITAL | Age: 44
End: 2024-01-25
Payer: MEDICARE

## 2024-01-25 DIAGNOSIS — B99.9 INFECTION: ICD-10-CM

## 2024-01-25 DIAGNOSIS — L03.90 CELLULITIS, UNSPECIFIED CELLULITIS SITE: ICD-10-CM

## 2024-01-25 DIAGNOSIS — A49.02 MRSA (METHICILLIN RESISTANT STAPHYLOCOCCUS AUREUS) INFECTION: Primary | ICD-10-CM

## 2024-01-25 LAB
ALBUMIN SERPL-MCNC: 3.4 G/DL (ref 3.4–5)
ALBUMIN/GLOB SERPL: 0.9 {RATIO} (ref 1–2)
ALP LIVER SERPL-CCNC: 63 U/L
ANION GAP SERPL CALC-SCNC: 6 MMOL/L (ref 0–18)
AST SERPL-CCNC: 18 U/L (ref 15–37)
BASOPHILS # BLD AUTO: 0.06 X10(3) UL (ref 0–0.2)
BASOPHILS NFR BLD AUTO: 0.4 %
BILIRUB SERPL-MCNC: 0.4 MG/DL (ref 0.1–2)
BILIRUB UR QL STRIP.AUTO: NEGATIVE
BUN BLD-MCNC: 19 MG/DL (ref 9–23)
CALCIUM BLD-MCNC: 8.5 MG/DL (ref 8.5–10.1)
CHLORIDE SERPL-SCNC: 101 MMOL/L (ref 98–112)
CLARITY UR REFRACT.AUTO: CLEAR
CO2 SERPL-SCNC: 27 MMOL/L (ref 21–32)
COLOR UR AUTO: YELLOW
CREAT BLD-MCNC: 0.82 MG/DL
CRP SERPL-MCNC: 6.19 MG/DL (ref ?–0.3)
EGFRCR SERPLBLD CKD-EPI 2021: 112 ML/MIN/1.73M2 (ref 60–?)
EOSINOPHIL # BLD AUTO: 0.23 X10(3) UL (ref 0–0.7)
EOSINOPHIL NFR BLD AUTO: 1.6 %
ERYTHROCYTE [DISTWIDTH] IN BLOOD BY AUTOMATED COUNT: 13.1 %
ERYTHROCYTE [SEDIMENTATION RATE] IN BLOOD: 14 MM/HR
GLOBULIN PLAS-MCNC: 3.8 G/DL (ref 2.8–4.4)
GLUCOSE BLD-MCNC: 110 MG/DL (ref 70–99)
GLUCOSE UR STRIP.AUTO-MCNC: NEGATIVE MG/DL
HCT VFR BLD AUTO: 40.6 %
HGB BLD-MCNC: 13.6 G/DL
IMM GRANULOCYTES # BLD AUTO: 0.04 X10(3) UL (ref 0–1)
IMM GRANULOCYTES NFR BLD: 0.3 %
KETONES UR STRIP.AUTO-MCNC: NEGATIVE MG/DL
LACTATE SERPL-SCNC: 0.7 MMOL/L (ref 0.4–2)
LEUKOCYTE ESTERASE UR QL STRIP.AUTO: NEGATIVE
LYMPHOCYTES # BLD AUTO: 1.68 X10(3) UL (ref 1–4)
LYMPHOCYTES NFR BLD AUTO: 11.8 %
MCH RBC QN AUTO: 31.2 PG (ref 26–34)
MCHC RBC AUTO-ENTMCNC: 33.5 G/DL (ref 31–37)
MCV RBC AUTO: 93.1 FL
MONOCYTES # BLD AUTO: 1.34 X10(3) UL (ref 0.1–1)
MONOCYTES NFR BLD AUTO: 9.4 %
NEUTROPHILS # BLD AUTO: 10.84 X10 (3) UL (ref 1.5–7.7)
NEUTROPHILS # BLD AUTO: 10.84 X10(3) UL (ref 1.5–7.7)
NEUTROPHILS NFR BLD AUTO: 76.5 %
NITRITE UR QL STRIP.AUTO: NEGATIVE
OSMOLALITY SERPL CALC.SUM OF ELEC: 281 MOSM/KG (ref 275–295)
PH UR STRIP.AUTO: 7.5 [PH] (ref 5–8)
PLATELET # BLD AUTO: 296 10(3)UL (ref 150–450)
POTASSIUM SERPL-SCNC: 3.8 MMOL/L (ref 3.5–5.1)
PROT SERPL-MCNC: 7.2 G/DL (ref 6.4–8.2)
PROT UR STRIP.AUTO-MCNC: NEGATIVE MG/DL
RBC # BLD AUTO: 4.36 X10(6)UL
RBC UR QL AUTO: NEGATIVE
SODIUM SERPL-SCNC: 134 MMOL/L (ref 136–145)
SP GR UR STRIP.AUTO: 1.01 (ref 1–1.03)
UROBILINOGEN UR STRIP.AUTO-MCNC: 0.2 MG/DL
WBC # BLD AUTO: 14.2 X10(3) UL (ref 4–11)

## 2024-01-25 PROCEDURE — 81003 URINALYSIS AUTO W/O SCOPE: CPT | Performed by: EMERGENCY MEDICINE

## 2024-01-25 PROCEDURE — 05CG0ZZ EXTIRPATION OF MATTER FROM RIGHT HAND VEIN, OPEN APPROACH: ICD-10-PCS | Performed by: STUDENT IN AN ORGANIZED HEALTH CARE EDUCATION/TRAINING PROGRAM

## 2024-01-25 PROCEDURE — 87147 CULTURE TYPE IMMUNOLOGIC: CPT | Performed by: STUDENT IN AN ORGANIZED HEALTH CARE EDUCATION/TRAINING PROGRAM

## 2024-01-25 PROCEDURE — 0JBJ0ZZ EXCISION OF RIGHT HAND SUBCUTANEOUS TISSUE AND FASCIA, OPEN APPROACH: ICD-10-PCS | Performed by: STUDENT IN AN ORGANIZED HEALTH CARE EDUCATION/TRAINING PROGRAM

## 2024-01-25 PROCEDURE — 87186 SC STD MICRODIL/AGAR DIL: CPT | Performed by: EMERGENCY MEDICINE

## 2024-01-25 PROCEDURE — 99285 EMERGENCY DEPT VISIT HI MDM: CPT

## 2024-01-25 PROCEDURE — 87205 SMEAR GRAM STAIN: CPT | Performed by: EMERGENCY MEDICINE

## 2024-01-25 PROCEDURE — 83605 ASSAY OF LACTIC ACID: CPT | Performed by: EMERGENCY MEDICINE

## 2024-01-25 PROCEDURE — 87205 SMEAR GRAM STAIN: CPT | Performed by: STUDENT IN AN ORGANIZED HEALTH CARE EDUCATION/TRAINING PROGRAM

## 2024-01-25 PROCEDURE — 87070 CULTURE OTHR SPECIMN AEROBIC: CPT | Performed by: EMERGENCY MEDICINE

## 2024-01-25 PROCEDURE — 87147 CULTURE TYPE IMMUNOLOGIC: CPT | Performed by: EMERGENCY MEDICINE

## 2024-01-25 PROCEDURE — 85652 RBC SED RATE AUTOMATED: CPT | Performed by: STUDENT IN AN ORGANIZED HEALTH CARE EDUCATION/TRAINING PROGRAM

## 2024-01-25 PROCEDURE — 87186 SC STD MICRODIL/AGAR DIL: CPT | Performed by: STUDENT IN AN ORGANIZED HEALTH CARE EDUCATION/TRAINING PROGRAM

## 2024-01-25 PROCEDURE — 87040 BLOOD CULTURE FOR BACTERIA: CPT | Performed by: EMERGENCY MEDICINE

## 2024-01-25 PROCEDURE — 85025 COMPLETE CBC W/AUTO DIFF WBC: CPT | Performed by: EMERGENCY MEDICINE

## 2024-01-25 PROCEDURE — 87075 CULTR BACTERIA EXCEPT BLOOD: CPT | Performed by: STUDENT IN AN ORGANIZED HEALTH CARE EDUCATION/TRAINING PROGRAM

## 2024-01-25 PROCEDURE — 73130 X-RAY EXAM OF HAND: CPT | Performed by: EMERGENCY MEDICINE

## 2024-01-25 PROCEDURE — 96376 TX/PRO/DX INJ SAME DRUG ADON: CPT

## 2024-01-25 PROCEDURE — 87070 CULTURE OTHR SPECIMN AEROBIC: CPT | Performed by: STUDENT IN AN ORGANIZED HEALTH CARE EDUCATION/TRAINING PROGRAM

## 2024-01-25 PROCEDURE — 36415 COLL VENOUS BLD VENIPUNCTURE: CPT

## 2024-01-25 PROCEDURE — 96374 THER/PROPH/DIAG INJ IV PUSH: CPT

## 2024-01-25 PROCEDURE — 80053 COMPREHEN METABOLIC PANEL: CPT | Performed by: EMERGENCY MEDICINE

## 2024-01-25 PROCEDURE — 86140 C-REACTIVE PROTEIN: CPT | Performed by: STUDENT IN AN ORGANIZED HEALTH CARE EDUCATION/TRAINING PROGRAM

## 2024-01-25 PROCEDURE — 73206 CT ANGIO UPR EXTRM W/O&W/DYE: CPT | Performed by: EMERGENCY MEDICINE

## 2024-01-25 PROCEDURE — 96361 HYDRATE IV INFUSION ADD-ON: CPT

## 2024-01-25 PROCEDURE — 0J9J0ZZ DRAINAGE OF RIGHT HAND SUBCUTANEOUS TISSUE AND FASCIA, OPEN APPROACH: ICD-10-PCS | Performed by: STUDENT IN AN ORGANIZED HEALTH CARE EDUCATION/TRAINING PROGRAM

## 2024-01-25 RX ORDER — SODIUM CHLORIDE 9 MG/ML
INJECTION, SOLUTION INTRAVENOUS ONCE
Status: COMPLETED | OUTPATIENT
Start: 2024-01-25 | End: 2024-01-25

## 2024-01-25 RX ORDER — OXYCODONE HYDROCHLORIDE 10 MG/1
30 TABLET ORAL EVERY 8 HOURS PRN
Status: DISCONTINUED | OUTPATIENT
Start: 2024-01-25 | End: 2024-01-27

## 2024-01-25 RX ORDER — ONDANSETRON 2 MG/ML
4 INJECTION INTRAMUSCULAR; INTRAVENOUS EVERY 4 HOURS PRN
Status: DISCONTINUED | OUTPATIENT
Start: 2024-01-25 | End: 2024-01-25

## 2024-01-25 RX ORDER — CEFAZOLIN SODIUM/WATER 2 G/20 ML
SYRINGE (ML) INTRAVENOUS AS NEEDED
Status: DISCONTINUED | OUTPATIENT
Start: 2024-01-25 | End: 2024-01-25 | Stop reason: SURG

## 2024-01-25 RX ORDER — LIDOCAINE HYDROCHLORIDE 10 MG/ML
INJECTION, SOLUTION INFILTRATION; PERINEURAL AS NEEDED
Status: DISCONTINUED | OUTPATIENT
Start: 2024-01-25 | End: 2024-01-25 | Stop reason: HOSPADM

## 2024-01-25 RX ORDER — HYDROMORPHONE HYDROCHLORIDE 1 MG/ML
0.4 INJECTION, SOLUTION INTRAMUSCULAR; INTRAVENOUS; SUBCUTANEOUS EVERY 5 MIN PRN
Status: DISCONTINUED | OUTPATIENT
Start: 2024-01-25 | End: 2024-01-25 | Stop reason: HOSPADM

## 2024-01-25 RX ORDER — MORPHINE SULFATE 4 MG/ML
4 INJECTION, SOLUTION INTRAMUSCULAR; INTRAVENOUS EVERY 2 HOUR PRN
Status: DISCONTINUED | OUTPATIENT
Start: 2024-01-25 | End: 2024-01-27

## 2024-01-25 RX ORDER — SODIUM CHLORIDE, SODIUM LACTATE, POTASSIUM CHLORIDE, CALCIUM CHLORIDE 600; 310; 30; 20 MG/100ML; MG/100ML; MG/100ML; MG/100ML
INJECTION, SOLUTION INTRAVENOUS CONTINUOUS PRN
Status: DISCONTINUED | OUTPATIENT
Start: 2024-01-25 | End: 2024-01-25 | Stop reason: SURG

## 2024-01-25 RX ORDER — ACETAMINOPHEN 500 MG
500 TABLET ORAL EVERY 4 HOURS PRN
Status: DISCONTINUED | OUTPATIENT
Start: 2024-01-25 | End: 2024-01-27

## 2024-01-25 RX ORDER — PROCHLORPERAZINE EDISYLATE 5 MG/ML
5 INJECTION INTRAMUSCULAR; INTRAVENOUS EVERY 8 HOURS PRN
Status: DISCONTINUED | OUTPATIENT
Start: 2024-01-25 | End: 2024-01-27

## 2024-01-25 RX ORDER — CLONAZEPAM 0.5 MG/1
1 TABLET ORAL 3 TIMES DAILY PRN
Status: DISCONTINUED | OUTPATIENT
Start: 2024-01-25 | End: 2024-01-27

## 2024-01-25 RX ORDER — MORPHINE SULFATE 2 MG/ML
2 INJECTION, SOLUTION INTRAMUSCULAR; INTRAVENOUS EVERY 2 HOUR PRN
Status: DISCONTINUED | OUTPATIENT
Start: 2024-01-25 | End: 2024-01-27

## 2024-01-25 RX ORDER — HYDROMORPHONE HYDROCHLORIDE 1 MG/ML
0.5 INJECTION, SOLUTION INTRAMUSCULAR; INTRAVENOUS; SUBCUTANEOUS EVERY 30 MIN PRN
Status: DISCONTINUED | OUTPATIENT
Start: 2024-01-25 | End: 2024-01-25

## 2024-01-25 RX ORDER — PROCHLORPERAZINE EDISYLATE 5 MG/ML
5 INJECTION INTRAMUSCULAR; INTRAVENOUS EVERY 8 HOURS PRN
Status: DISCONTINUED | OUTPATIENT
Start: 2024-01-25 | End: 2024-01-25 | Stop reason: HOSPADM

## 2024-01-25 RX ORDER — PANTOPRAZOLE SODIUM 40 MG/1
40 TABLET, DELAYED RELEASE ORAL
Status: DISCONTINUED | OUTPATIENT
Start: 2024-01-26 | End: 2024-01-27

## 2024-01-25 RX ORDER — IOHEXOL 350 MG/ML
100 INJECTION, SOLUTION INTRAVENOUS
Status: COMPLETED | OUTPATIENT
Start: 2024-01-25 | End: 2024-01-25

## 2024-01-25 RX ORDER — NALOXONE HYDROCHLORIDE 0.4 MG/ML
0.08 INJECTION, SOLUTION INTRAMUSCULAR; INTRAVENOUS; SUBCUTANEOUS AS NEEDED
Status: DISCONTINUED | OUTPATIENT
Start: 2024-01-25 | End: 2024-01-25 | Stop reason: HOSPADM

## 2024-01-25 RX ORDER — MIDAZOLAM HYDROCHLORIDE 1 MG/ML
INJECTION INTRAMUSCULAR; INTRAVENOUS AS NEEDED
Status: DISCONTINUED | OUTPATIENT
Start: 2024-01-25 | End: 2024-01-25 | Stop reason: SURG

## 2024-01-25 RX ORDER — MORPHINE SULFATE 2 MG/ML
1 INJECTION, SOLUTION INTRAMUSCULAR; INTRAVENOUS EVERY 2 HOUR PRN
Status: DISCONTINUED | OUTPATIENT
Start: 2024-01-25 | End: 2024-01-27

## 2024-01-25 RX ORDER — VANCOMYCIN HYDROCHLORIDE
15 EVERY 12 HOURS
Status: DISCONTINUED | OUTPATIENT
Start: 2024-01-25 | End: 2024-01-27

## 2024-01-25 RX ORDER — MELATONIN
3 NIGHTLY PRN
Status: DISCONTINUED | OUTPATIENT
Start: 2024-01-25 | End: 2024-01-27

## 2024-01-25 RX ORDER — HYDROMORPHONE HYDROCHLORIDE 1 MG/ML
0.6 INJECTION, SOLUTION INTRAMUSCULAR; INTRAVENOUS; SUBCUTANEOUS EVERY 5 MIN PRN
Status: DISCONTINUED | OUTPATIENT
Start: 2024-01-25 | End: 2024-01-25 | Stop reason: HOSPADM

## 2024-01-25 RX ORDER — LIDOCAINE HYDROCHLORIDE 10 MG/ML
INJECTION, SOLUTION EPIDURAL; INFILTRATION; INTRACAUDAL; PERINEURAL AS NEEDED
Status: DISCONTINUED | OUTPATIENT
Start: 2024-01-25 | End: 2024-01-25 | Stop reason: SURG

## 2024-01-25 RX ORDER — HYDROMORPHONE HYDROCHLORIDE 1 MG/ML
INJECTION, SOLUTION INTRAMUSCULAR; INTRAVENOUS; SUBCUTANEOUS EVERY 30 MIN PRN
Status: DISCONTINUED | OUTPATIENT
Start: 2024-01-25 | End: 2024-01-25

## 2024-01-25 RX ORDER — DEXAMETHASONE SODIUM PHOSPHATE 4 MG/ML
VIAL (ML) INJECTION AS NEEDED
Status: DISCONTINUED | OUTPATIENT
Start: 2024-01-25 | End: 2024-01-25 | Stop reason: SURG

## 2024-01-25 RX ORDER — HYDROMORPHONE HYDROCHLORIDE 1 MG/ML
0.2 INJECTION, SOLUTION INTRAMUSCULAR; INTRAVENOUS; SUBCUTANEOUS EVERY 5 MIN PRN
Status: DISCONTINUED | OUTPATIENT
Start: 2024-01-25 | End: 2024-01-25 | Stop reason: HOSPADM

## 2024-01-25 RX ORDER — ONDANSETRON 2 MG/ML
INJECTION INTRAMUSCULAR; INTRAVENOUS AS NEEDED
Status: DISCONTINUED | OUTPATIENT
Start: 2024-01-25 | End: 2024-01-25 | Stop reason: SURG

## 2024-01-25 RX ORDER — ONDANSETRON 2 MG/ML
4 INJECTION INTRAMUSCULAR; INTRAVENOUS EVERY 6 HOURS PRN
Status: DISCONTINUED | OUTPATIENT
Start: 2024-01-25 | End: 2024-01-27

## 2024-01-25 RX ORDER — SODIUM CHLORIDE, SODIUM LACTATE, POTASSIUM CHLORIDE, CALCIUM CHLORIDE 600; 310; 30; 20 MG/100ML; MG/100ML; MG/100ML; MG/100ML
INJECTION, SOLUTION INTRAVENOUS CONTINUOUS
Status: DISCONTINUED | OUTPATIENT
Start: 2024-01-25 | End: 2024-01-25 | Stop reason: HOSPADM

## 2024-01-25 RX ORDER — SODIUM CHLORIDE 9 MG/ML
INJECTION, SOLUTION INTRAVENOUS CONTINUOUS
Status: DISCONTINUED | OUTPATIENT
Start: 2024-01-25 | End: 2024-01-25

## 2024-01-25 RX ORDER — VANCOMYCIN HYDROCHLORIDE 1 G/20ML
INJECTION, POWDER, LYOPHILIZED, FOR SOLUTION INTRAVENOUS
Status: COMPLETED
Start: 2024-01-25 | End: 2024-01-25

## 2024-01-25 RX ORDER — VANCOMYCIN HYDROCHLORIDE
15 EVERY 12 HOURS
Status: DISCONTINUED | OUTPATIENT
Start: 2024-01-25 | End: 2024-01-25

## 2024-01-25 RX ORDER — CLONAZEPAM 0.5 MG/1
2 TABLET ORAL 3 TIMES DAILY PRN
Status: DISCONTINUED | OUTPATIENT
Start: 2024-01-25 | End: 2024-01-25

## 2024-01-25 RX ORDER — ONDANSETRON 2 MG/ML
4 INJECTION INTRAMUSCULAR; INTRAVENOUS EVERY 6 HOURS PRN
Status: DISCONTINUED | OUTPATIENT
Start: 2024-01-25 | End: 2024-01-25 | Stop reason: HOSPADM

## 2024-01-25 RX ORDER — HYDROCODONE BITARTRATE AND ACETAMINOPHEN 5; 325 MG/1; MG/1
2 TABLET ORAL ONCE AS NEEDED
Status: DISCONTINUED | OUTPATIENT
Start: 2024-01-25 | End: 2024-01-25 | Stop reason: HOSPADM

## 2024-01-25 RX ORDER — HYDROCODONE BITARTRATE AND ACETAMINOPHEN 5; 325 MG/1; MG/1
1 TABLET ORAL ONCE AS NEEDED
Status: DISCONTINUED | OUTPATIENT
Start: 2024-01-25 | End: 2024-01-25 | Stop reason: HOSPADM

## 2024-01-25 RX ORDER — HEPARIN SODIUM 5000 [USP'U]/ML
5000 INJECTION, SOLUTION INTRAVENOUS; SUBCUTANEOUS EVERY 8 HOURS SCHEDULED
Status: DISCONTINUED | OUTPATIENT
Start: 2024-01-25 | End: 2024-01-27

## 2024-01-25 RX ORDER — BUPIVACAINE HYDROCHLORIDE 5 MG/ML
INJECTION, SOLUTION EPIDURAL; INTRACAUDAL AS NEEDED
Status: DISCONTINUED | OUTPATIENT
Start: 2024-01-25 | End: 2024-01-25 | Stop reason: HOSPADM

## 2024-01-25 RX ORDER — HYDROMORPHONE HYDROCHLORIDE 1 MG/ML
INJECTION, SOLUTION INTRAMUSCULAR; INTRAVENOUS; SUBCUTANEOUS EVERY 30 MIN PRN
Status: COMPLETED | OUTPATIENT
Start: 2024-01-25 | End: 2024-01-25

## 2024-01-25 RX ORDER — ACETAMINOPHEN 500 MG
1000 TABLET ORAL ONCE AS NEEDED
Status: DISCONTINUED | OUTPATIENT
Start: 2024-01-25 | End: 2024-01-25 | Stop reason: HOSPADM

## 2024-01-25 RX ADMIN — SODIUM CHLORIDE, SODIUM LACTATE, POTASSIUM CHLORIDE, CALCIUM CHLORIDE: 600; 310; 30; 20 INJECTION, SOLUTION INTRAVENOUS at 20:55:00

## 2024-01-25 RX ADMIN — ONDANSETRON 4 MG: 2 INJECTION INTRAMUSCULAR; INTRAVENOUS at 21:10:00

## 2024-01-25 RX ADMIN — LIDOCAINE HYDROCHLORIDE 100 MG: 10 INJECTION, SOLUTION EPIDURAL; INFILTRATION; INTRACAUDAL; PERINEURAL at 20:59:00

## 2024-01-25 RX ADMIN — DEXAMETHASONE SODIUM PHOSPHATE 4 MG: 4 MG/ML VIAL (ML) INJECTION at 21:10:00

## 2024-01-25 RX ADMIN — MIDAZOLAM HYDROCHLORIDE 2 MG: 1 INJECTION INTRAMUSCULAR; INTRAVENOUS at 20:57:00

## 2024-01-25 RX ADMIN — CEFAZOLIN SODIUM/WATER 2 G: 2 G/20 ML SYRINGE (ML) INTRAVENOUS at 21:08:00

## 2024-01-25 NOTE — CONSULTS
ORTHOPAEDIC SURGERY CONSULT NOTE    Consulting Physician: Sushma Villavicencio MD    Patient Name: Melchor Laws  Age:  43 year old  Sex: Male  MRN: [id number]  : 1980  Date of Admission: 2024    REASON FOR CONSULTATION:  Left arm infection, concern for deep space infection right hand     HISTORY OF PRESENT ILLNESS:  This is a 43 RHD male with notable history for ankylosing spondylitis, not recently on immunosuppresants, who presents today with increasing redness, swelling, and pain in the right hand and particularly the right middle finger after he removed an ingrown hair that had a pustule which he attempted to decompress. He also recently got a tattoo sleeve on his left arm in [place] a week ago an also reports removing ingrown hairs here as well, but denies significant pain in this arm and actually reports his symptoms on his left side have improved recently. He denies numbness/tingling.     Past Medical History:   Diagnosis Date    Ankylosing spondylitis (HCC)     Esophageal reflux EGD     Gout      Past Surgical History:   Procedure Laterality Date    COLONOSCOPY  ~    LASIK      OTHER SURGICAL HISTORY      shoulder right repair    SHOULDER ARTHROSCOPY Right 2013    UPPER GI ENDOSCOPY PERFORMED  ~     No current outpatient medications on file.  No Known Allergies  History reviewed. No pertinent family history.    Review of Systems:  Pertinent orthopedic positives include + R hand pain/redness swelling, to a lesser degree in the left arm     PHYSICAL EXAM:  Vitals:    24 1552   BP: 132/78   Pulse: 86   Resp: 18   Temp: 98.3 °F (36.8 °C)      No acute distress, well appearing  On the left upper extremity, there is a healing tattoo. There is no significant erythema or swelling. There are multiple healing excoriations with minimal surrounding erythema and tenderness. There is no palpable fluctuance. Distally NVI M/U/R nerve distributions, 2+ RP  On the right upper extremity, there  is moderate swelling with erythema in the hand that extends into the middle finger  There is no significant TTP about the MP or PIP joint and there is no MMT here  There is no fusiform swelling, no TTP along the entire length of the flexor tendon sheath, no pain with passive stretch  There is marked TTP about the dorsal > volar aspect of the proximal phalanx with two small openings in the skin that are draining purulence  Unable to make a fist  Distally NVI M/U/R nerve distributions, 2+ RP    Diagnostics:  Lab Results   Component Value Date    WBC 14.2 01/25/2024    HGB 13.6 01/25/2024    HCT 40.6 01/25/2024    .0 01/25/2024    CREATSERUM 0.82 01/25/2024    BUN 19 01/25/2024     01/25/2024    K 3.8 01/25/2024     01/25/2024    CO2 27.0 01/25/2024     01/25/2024    CA 8.5 01/25/2024    ALB 3.4 01/25/2024    ALKPHO 63 01/25/2024    BILT 0.4 01/25/2024    TP 7.2 01/25/2024    AST 18 01/25/2024    ALT  01/25/2024      Comment:      Due to  backorder we are temporarily unable to offer hospital-based ALT testing at [facility].   If urgently needed, please order ALT test code [id number].   The new order will need a new venipuncture and will be sent to [facility] for testing.   The expected turnaround time will be within 24 hours.     ESRML 14 01/25/2024    CRP 6.19 01/25/2024     Comp Metabolic Panel (14)        Component Value Flag Ref Range Units Status    Glucose 110      70 - 99 mg/dL Final    Sodium 134      136 - 145 mmol/L Final    Potassium 3.8      3.5 - 5.1 mmol/L Final    Chloride 101      98 - 112 mmol/L Final    CO2 27.0      21.0 - 32.0 mmol/L Final    Anion Gap 6      0 - 18 mmol/L Final    BUN 19      9 - 23 mg/dL Final    Creatinine 0.82      0.70 - 1.30 mg/dL Final    Calcium, Total 8.5      8.5 - 10.1 mg/dL Final    Calculated Osmolality 281      275 - 295 mOsm/kg Final    eGFR-Cr 112      >=60 mL/min/1.73m2 Final    AST 18      15 - 37 U/L Final    ALT         Final     Comment:    Due to  backorder we are temporarily unable to offer hospital-based ALT testing at [facility].   If urgently needed, please order ALT test code [id number].   The new order will need a new venipuncture and will be sent to [facility] for testing.   The expected turnaround time will be within 24 hours.     Alkaline Phosphatase 63      45 - 117 U/L Final    Bilirubin, Total 0.4      0.1 - 2.0 mg/dL Final    Total Protein 7.2      6.4 - 8.2 g/dL Final    Albumin 3.4      3.4 - 5.0 g/dL Final    Globulin  3.8      2.8 - 4.4 g/dL Final    A/G Ratio 0.9      1.0 - 2.0  Final                  Urinalysis with Culture Reflex        Specimen Information: Urine, clean catch      Component Value Flag Ref Range Units Status    Urine Color Yellow      Yellow  Final    Clarity Urine Clear      Clear  Final    Spec Gravity 1.015      1.005 - 1.030  Final    Glucose Urine Negative      Negative mg/dL Final    Bilirubin Urine Negative      Negative  Final    Ketones Urine Negative      Negative mg/dL Final    Blood Urine Negative      Negative  Final    pH Urine 7.5      5.0 - 8.0  Final    Protein Urine Negative      Negative mg/dL Final    Urobilinogen Urine 0.2      <2.0 mg/dL Final    Nitrite Urine Negative      Negative  Final    Leukocyte Esterase Urine Negative      Negative  Final    Microscopic Microscopic not indicated        Final                  Lactic Acid, Plasma        Component Value Flag Ref Range Units Status    Lactic Acid 0.7      0.4 - 2.0 mmol/L Final                  XR HAND (MIN 3 VIEWS), RIGHT (CPT=73130)          PROCEDURE:  XR HAND (MIN 3 VIEWS), RIGHT (CPT=73130)     TECHNIQUE:  Three views of the right hand were obtained.      COMPARISON:  [facility], XR, XR HAND (MIN 3 VIEWS), LEFT (CPT=73130), 1/25/2024, 9:26 AM.     INDICATIONS:  skin infection     PATIENT STATED HISTORY: (As transcribed by Technologist)  Patient has redness and swelling throughout the proximal 3rd digit for a  few days which he says might be an infection from an ingrown hair.  He has severe pain radiating throughout the entire hand   and up the right arm.  Patient is also having redness and swelling to his left hand and states he had a tattoo done on the left hand about 4-5 days ago.  Patient has limited range of motion and is unable to fully extend his hand and fingers.         FINDINGS:  There is soft tissue swelling along the dorsum of the hand and 3rd digit which can be seen in cellulitis.  There is no evidence of gas within the soft tissues.  The bones demonstrate no fracture dislocation.  No lytic or blastic lesion.                   =====  CONCLUSION:  Dorsal soft tissue swelling of the hand can be seen in cellulitis.  There is no bony abnormality to suggest osteomyelitis by plain film.        LOCATION:  [facility]        Dictated by (CST): Ciro Delgado MD on 1/25/2024 at 10:15 AM       Finalized by (CST): Ciro Delgado MD on 1/25/2024 at 10:17 AM            XR HAND (MIN 3 VIEWS), LEFT (CPT=73130)          PROCEDURE:  XR HAND (MIN 3 VIEWS), LEFT (CPT=73130)     TECHNIQUE:  Three views of the left hand were obtained.      COMPARISON:  None.     INDICATIONS:  skin infection     PATIENT STATED HISTORY: (As transcribed by Technologist)  Patient had a tattoo done on his left hand about 4-5 days ago and has been experiencing pain throughout the hand.  He also has abrasion-like wounds on the dorsal hand.         FINDINGS:  There is no fracture, dislocation, or subluxation.  There is no lytic or blastic lesions.  The soft tissues are unremarkable.  The alignment of the bones is within normal limits.                   =====  CONCLUSION:  No acute disease.          LOCATION:  [facility]        Dictated by (CST): Ciro Delgado MD on 1/25/2024 at 10:13 AM       Finalized by (CST): Ciro Delgado MD on 1/25/2024 at 10:13 AM            CBC W/ DIFFERENTIAL        Component Value Flag Ref Range Units Status    WBC 14.2      4.0 - 11.0  x10(3) uL Final    RBC 4.36      4.30 - 5.70 x10(6)uL Final    HGB 13.6      13.0 - 17.5 g/dL Final    HCT 40.6      39.0 - 53.0 % Final    .0      150.0 - 450.0 10(3)uL Final    MCV 93.1      80.0 - 100.0 fL Final    MCH 31.2      26.0 - 34.0 pg Final    MCHC 33.5      31.0 - 37.0 g/dL Final    RDW 13.1       % Final    Neutrophil Absolute Prelim 10.84      1.50 - 7.70 x10 (3) uL Final    Neutrophil Absolute 10.84      1.50 - 7.70 x10(3) uL Final    Lymphocyte Absolute 1.68      1.00 - 4.00 x10(3) uL Final    Monocyte Absolute 1.34      0.10 - 1.00 x10(3) uL Final    Eosinophil Absolute 0.23      0.00 - 0.70 x10(3) uL Final    Basophil Absolute 0.06      0.00 - 0.20 x10(3) uL Final    Immature Granulocyte Absolute 0.04      0.00 - 1.00 x10(3) uL Final    Neutrophil % 76.5       % Final    Lymphocyte % 11.8       % Final    Monocyte % 9.4       % Final    Eosinophil % 1.6       % Final    Basophil % 0.4       % Final    Immature Granulocyte % 0.3       % Final                  CTA UPPER EXTREMITY LEFT(IV ONLY)(CPT=73206)          PROCEDURE:  CTA UPPER EXTREMITY LEFT (IV ONLY) (CPT=73206)     COMPARISON:  [facility], XR, XR HAND (MIN 3 VIEWS), LEFT (CPT=73130), 1/25/2024, 9:26 AM.  [facility], XR, XR HAND (MIN 3 VIEWS), RIGHT (CPT=73130), 1/25/2024, 9:28 AM.     INDICATIONS:  skin infection     TECHNIQUE:  CT images were obtained without and with non-ionic intravenous contrast material. Multi-planar reformatted/3-D images were created to optimize visualization of vascular anatomy.   Dose reduction techniques were used. Dose information is   transmitted to the ACR (American College of Radiology) NRDR (National Radiology Data Registry) which includes the Dose Index Registry.        PATIENT STATED HISTORY:(As transcribed by Technologist)  Patient has left hand swelling from a one week old tattoo.      CONTRAST USED:  100cc of Omnipaque 350     FINDINGS:    REGION:  Left upper extremity.  ARTERIES:  No significant  obstruction or occlusion.    OTHER:  No aggressive cortical destructive process involving the humerus, radius, ulna, carpal bones or metacarpals to suggest osteomyelitis.  There is some soft tissue swelling and subcutaneous soft tissue stranding along the posterior lateral aspect of   the mid to distal forearm extending to the left hand..                   =====  CONCLUSION:  No evidence of arterial occlusion to the level of the hand.     Soft tissue swelling with subcutaneous edema involving the mid to distal posterior lateral forearm and dorsal aspect of the carpus.  This is nonspecific although suggestive of cellulitis.  No discrete peripheral drainable fluid collection.  If there is   persistent clinical concern for osteomyelitis,  MRI with contrast may be helpful for further evaluation.         LOCATION:  [id number]           Dictated by (CST): Guillermina Dyson MD on 1/25/2024 at 1:05 PM       Finalized by (CST): Guillermina Dyson MD on 1/25/2024 at 1:13 PM            Sed Rate, [place] (Automated)        Component Value Flag Ref Range Units Status    Sed Rate 14      0 - 15 mm/Hr Final                  C-Reactive Protein        Component Value Flag Ref Range Units Status    C-Reactive Protein 6.19      <0.30 mg/dL Final                      ASSESSMENT AND PLAN:  This is a 43M who has clinical signs and symptoms concerning for a right middle finger abscess with surrounding cellulitis, as well as mild cellulitis on the left arm in setting of recent tattoo, low concern for deep space infection here based on imaging and exam.     I had a lengthy discussion with the patient regarding the above and the treatment options going forward. I have recommended surgical drainage of the right hand, specifically the middle finger. The risks/benefits/alternatives of surgery as well as the recovery and rehabiltiative timeline an process were discussed at length. Will proceed later today.    Keep NPO  Will need to be on antibiotics  post-operatively pending cultures, will need ID consult   SCDs OK from my perspective for DVT ppx   Multimodal pain control post op    The patient understands the natural history of their disease and all their options.   Risks and benefits, as well as alternatives, of surgery were discussed in detail.   Risks including infection, bleeding, damage to nerves, arteries, tendons, persistent pain, failure of surgery to relieve all pain/dysfunction/deformity, malunion, nonunion (if a bony injury), recurrence of problem, and need for further surgery were all discussed.  Risks for anesthesia complications, DVT and death were also discussed  All questions were answered and they agree to the plan.    Thank you for allowing me to participate in this patient’s care.    Sushma Villavicencio MD  [facility]  Hand and Upper Extremity Specialist  Orthopaedic Surgery

## 2024-01-25 NOTE — PROGRESS NOTES
Called about patient. History, labs, images concerning for a deep space infection of the right hand and middle finger that likely needs surgical drainage. Also having symptoms of cellulitis on the left hand and forearm in setting of recent tattoo. Recommend patient be transferred to [facility] for OR later tonight. Recommend STAT MRI of the left forearm AND hand to see if there is any deep space infection here as well that would require surgical drainage. Please obtain MRI as soon as possible. Tentative OR time ~ 1800 tonight. Keep NPO.     Sushma Villavicencio  Orthopaedic Surgery

## 2024-01-25 NOTE — OPERATIVE REPORT
OPERATIVE REPORT    Patient Name: Melchor Laws  Age:  43 year old  Sex: Male  MRN: [id number]  : 1980  Date of Admission: 2024  Date of Surgery: 24  Primary Surgeon: Sushma Villavicencio MD  Assistant(s): None      Preoperative Diagnosis: Right middle finger abscess     Postoperative Diagnosis: Same as pre-operative diagnosis      Operation Performed: Right middle finger abscess incision and drainage, CPT code 94467     Implants: None     Anesthesia: General    Complications: None    Estimated Blood Loss: Minimal    Tourniquet Time: 13 minutes     Specimens: Abscess cultures     Antibiotics: Ancef    INDICATIONS FOR SURGERY:   The patient is a 43 year old year-old male with history, exam, and diagnostics consistent with a right hand deep space infection. I recommended and the patient elected surgical drainage. The risks of surgery include, but are not limited to, bleeding, infection, injury to neurovascular structures, DVT, PE, other medical complications, pain, stiffness, need for more procedures, recurrence of infection. The patient understood all of these and gave consent to proceed freely.     OPERATIVE PROCEDURE:  The patient was seen in the preoperative holding area, where the correct site was marked.  All questions regarding the procedure and postoperative rehabilitation were discussed.  The consent was signed. H&P and allergies were reviewed.     The patient was then brought into the operating room and placed supine on a regular table with a hand table. The patient had SCD boots for DVT prophylaxis. The patient was administered anesthesia.  All bony prominences were padded. They received a dose of IV antibiotics. A nonsterile tourniquet was placed on the upper brachium. The right upper extremity was then prepped and draped in standard sterile fashion.  A time-out was performed by all members of the staff, which confirmed the right side was the correct operative side. The limb was  exsanguinated with an esmarch and the tourniquet was then inflated to 250 mmHg.     I made a dorsal curvilinear incision along the radial border of the proximal phalanx. Hemostasis was archived with bipolar cautery. Incision was made through skin and subcutaneous tissue with a 15 blade. Immediate purulence was encountered. Cultures were taken. The patient received a dose of antibiotics. I decompressed the abscess. There was also significant phlegmon and thrombosed veins that were sharply excised. The subcutaneous tissue was debrided with a curette. I explored the dorsal soft tissues superficial to the extensor tendon as well as volar and the purulence did track somewhat volar. I therefore made a small counter incision over the volar aspect of the proximal phalanx in a Brunner fashion. The tissue here was healthy. I also placed a hemostat around the bone dorsally and spread in the volar tissues and the purulence did not track this deeply. I then copiously irrigated both wounds. The tourniquet was let down for the time noted above.  Hemostasis was achieved with bipolar cautery.  The volar wound was closed with 4-0 Nylon given the absence of infection here. The dorsal wound was packing and very loosely closed with two 4-0 Nylon sutures.     All the fingers were warm with good capillary refill.  All sponge, sharp, and instrument counts were correct. A bulky sterile dressing and volar resting splint was applied. The patient was then awoken from anesthesia and transferred to the recovery room in stable condition.    Post-Operative Plan:  PWB soft dressing and splint, to be removed POD1 to start TID soaks with nursing  Follow up cultures  Vanc pending cultures  Recommend ID consult   Multimodal pain control  Trend WBC and CBC  Daily OT     Sushma Villavicencio MD  Orthopedic Surgery

## 2024-01-25 NOTE — ED PROVIDER NOTES
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  He had an ingrown hair on his right third finger that he pulled a few days ago, started to notice swelling and drainage to the area building up, saw his primary yesterday, and was started on Keflex.  Patient also had a friend who does his tattoos, was visiting from [place], and patient decided to have a large tattoo of his left forearm done with his daughter's name on it.  This was several days ago, patient has had multiple sores over those areas, swelling and pain.  Now with pain, malaise, exhaustion  Off biologic agents after the hospital admissions last year.    Objective:   Past Medical History:   Diagnosis Date    Ankylosing spondylitis (HCC)     Esophageal reflux EGD 2008    Gout               Past Surgical History:   Procedure Laterality Date    COLONOSCOPY  ~2008    LASIK      OTHER SURGICAL HISTORY      shoulder right repair    SHOULDER ARTHROSCOPY Right 2013    UPPER GI ENDOSCOPY PERFORMED  ~2008                Social History     Socioeconomic History    Marital status: Single   Tobacco Use    Smoking status: Never    Smokeless tobacco: Never   Vaping Use    Vaping Use: Every day   Substance and Sexual Activity    Alcohol use: Yes     Comment: occasionally    Drug use: Yes     Types: Cannabis     Comment: last use 4/27     Social Determinants of Health     Food Insecurity: No Food Insecurity (1/25/2024)    Food Insecurity     Food Insecurity: Never true   Transportation Needs: No Transportation Needs (1/25/2024)    Transportation Needs     Lack of Transportation: No    Housing Stability: Low Risk  (1/25/2024)    Housing Stability     Housing Instability: No              Review of Systems    Positive for stated complaint: skin infection  Other systems are as noted in HPI.  Constitutional and vital signs reviewed.      All other systems reviewed and negative except as noted above.    Physical Exam     ED Triage Vitals   BP 01/25/24 0750 140/90   Pulse 01/25/24 0750 96   Resp 01/25/24 0750 18   Temp 01/25/24 0750 99 °F (37.2 °C)   Temp src 01/25/24 0912 Oral   SpO2 01/25/24 0750 99 %   O2 Device 01/25/24 0750 None (Room air)       Current:/78 (BP Location: Left arm)   Pulse 86   Temp 98.3 °F (36.8 °C) (Oral)   Resp 18   Ht 185.4 cm (6' 1\")   Wt 88.5 kg   SpO2 98%   BMI 25.73 kg/m²         Physical Exam    General: Well-developed, well-nourished, moderate distress secondary to pain  Head and neck: Normocephalic, atraumatic  Cardiovascular: Regular rate and rhythm, normal S1 and S2, no obvious murmurs, rubs, or gallops   Lungs: Clear to auscultation bilaterally with equal breath sounds, no wheezing, no rhonchi   Abdomen: Positive bowel sounds,  soft, no tenderness, no distension, no rebound, no guarding   Extremities: No cyanosis, no clubbing, no edema   Musculoskeletal: Swelling, tenderness, erythema, right third finger proximal phalanx area, extending proximally over the hand   Skin: Left forearm tattoos newly placed, multiple areas of skin sores, areas of erythema and swelling  Neuro: Grossly intact to patient's baseline except over affected areas                      ED Course     Labs Reviewed   COMP METABOLIC PANEL (14) - Abnormal; Notable for the following components:       Result Value    Glucose 110 (*)     Sodium 134 (*)     A/G Ratio 0.9 (*)     All other components within normal limits   C-REACTIVE PROTEIN - Abnormal; Notable for the following components:    C-Reactive Protein 6.19 (*)     All other components within normal limits   CBC W/ DIFFERENTIAL -  Abnormal; Notable for the following components:    WBC 14.2 (*)     Neutrophil Absolute Prelim 10.84 (*)     Neutrophil Absolute 10.84 (*)     Monocyte Absolute 1.34 (*)     All other components within normal limits   LACTIC ACID, PLASMA - Normal   SED RATE, WESTERGREN (AUTOMATED) - Normal   CBC WITH DIFFERENTIAL WITH PLATELET    Narrative:     The following orders were created for panel order CBC With Differential With Platelet.  Procedure                               Abnormality         Status                     ---------                               -----------         ------                     CBC W/ DIFFERENTIAL[[id number]]          Abnormal            Final result                 Please view results for these tests on the individual orders.   URINALYSIS WITH CULTURE REFLEX   BLOOD CULTURE   BLOOD CULTURE   AEROBIC BACTERIAL CULTURE   AEROBIC BACTERIAL CULTURE   Differential diagnosis includes MRSA infection, cellulitis, soft tissue infection, osteomyelitis, among other processes          X-rays of right hand, left hand images reviewed by myself show soft tissue swelling, no a other acute process.  Radiology read concurs  CT left upper extremity with IV contrast shows soft tissue swelling, subcu edema, no free air no abscess         MDM      Patient with history of MRSA, ankylosing spondylitis, GERD, pseudogout, history of paranasal sinus bone infection and abscess March 2023, left lower extremity and knee joint infection July 2023, off of biologic agents and immunosuppressants since the hospital admissions last year, now with significant infection to right hand and left forearm as in images above after ingrown hair pulled on the right third finger and extensive tattoo placed left forearm.  Patient immediately given IV fluids, vancomycin, pain relief.  Admission disposition: 1/25/2024  8:19 AM       Case discussed with Dr. Villavicencio.  Patient will be transferred to [facility] hospital, likely have the right third  finger drained.  To determine whether left arm needs any draining CT is done.  No evidence for free air or fluid collection.  Case discussed with Dr. Nichole.    Patient will be admitted for further IV antibiotics, surgical intervention.                              [facility] IN [place]      Sepsis Reassessment Note    /90   Pulse 96   Temp 99 °F (37.2 °C)   Resp 18   Ht 185.4 cm (6' 1\")   Wt 88.5 kg   SpO2 99%   BMI 25.73 kg/m²      I completed the sepsis reassessment at 145pm    Cardiac:  Regularity: Regular  Rate: Normal  Heart Sounds: S1,S2    Lungs:   Right: Clear  Left: Clear    Peripheral Pulses:  Radial: Right 1+ or Left 1+      Capillary Refill:  <3 Secs    Skin:  Temp/Moisture: Warm and Dry  Color: Normal      Jacquelin Horn MD  1/25/2024  8:14 AM            Medical Decision Making      Disposition and Plan     Clinical Impression:  1. MRSA (methicillin resistant Staphylococcus aureus) infection    2. Cellulitis, unspecified cellulitis site         Disposition:  Admit  1/25/2024  8:19 am    Follow-up:  No follow-up provider specified.        Medications Prescribed:  Current Discharge Medication List                            Hospital Problems       Present on Admission  Date Reviewed: 4/12/2023            ICD-10-CM Noted POA    * (Principal) MRSA (methicillin resistant Staphylococcus aureus) infection A49.02 1/25/2024 Unknown    Cellulitis, unspecified cellulitis site L03.90 1/25/2024 Unknown

## 2024-01-25 NOTE — ED QUICK NOTES
Orders for admission, patient is aware of plan and ready to go upstairs. Any questions, please call ED RN Andrea  at extension [phone].     Vaccinated? yes  Type of COVID test sent: none  COVID Suspicion level: Low    Titratable drug(s) infusin.9 NS   Rate: 125 ml/hr    LOC at time of transport: A+Ox3    Other pertinent information: MRSA    CIWA score= n/a  NIH score= n/a

## 2024-01-25 NOTE — ED INITIAL ASSESSMENT (HPI)
Bilateral hand swelling.. right hand has redness and swelling to 3rd digit. Pt also has swelling to left hand from 1 week old tattoo.

## 2024-01-25 NOTE — H&P
[facility] History and Physical      Chief Complaint   Patient presents with    Cellulitis        PCP: Stalin Lopes MD      History of Present Illness: Patient is a 43 year old male with PMH sig for GERD, gout, ankylosing spondylitis, presents for eval of R hand infection/cellulitis.      Ho MRSA in past.      Lots of pain.  Feeling weak.      Says it started with ingrown hair.      Past Medical History:   Diagnosis Date    Ankylosing spondylitis (HCC)     Esophageal reflux EGD 2008    Gout       Past Surgical History:   Procedure Laterality Date    COLONOSCOPY  ~2008    LASIK      OTHER SURGICAL HISTORY      shoulder right repair    SHOULDER ARTHROSCOPY Right 2013    UPPER GI ENDOSCOPY PERFORMED  ~2008        ALL:  No Known Allergies     No current outpatient medications on file.       Social History     Tobacco Use    Smoking status: Never    Smokeless tobacco: Never   Substance Use Topics    Alcohol use: Yes     Comment: occasionally        Fam Hx  History reviewed. No pertinent family history.    Review of Systems  Comprehensive ROS reviewed and negative except for what is stated in HPI.      OBJECTIVE:  /78 (BP Location: Left arm)   Pulse 86   Temp 98.3 °F (36.8 °C) (Oral)   Resp 18   Ht 6' 1\" (1.854 m)   Wt 195 lb (88.5 kg)   SpO2 98%   BMI 25.73 kg/m²   General:  Alert, no distress, appears stated age.   Head:  Normocephalic, without obvious abnormality, atraumatic.   Eyes:  Sclera anicteric, No conjunctival pallor, EOMs intact.    Nose: Nares normal. Septum midline. Mucosa normal. No drainage.   Throat: Lips, mucosa, and tongue normal. Teeth and gums normal.   Neck: Supple, symmetrical, trachea midline, no cervical or supraclavicular lymph adenopathy, thyroid: no enlargment/tenderness/nodules appreciated   Lungs:   Clear to auscultation bilaterally. Normal effort   Chest wall:  No tenderness or deformity.   Heart:  Regular rate and rhythm, S1, S2 normal, no murmur, rub or gallop  appreciated   Abdomen:   Soft, non-tender. Bowel sounds normal. No masses,  No organomegaly. Non distended   Extremities: Extremities normal, atraumatic, no cyanosis or edema.   Skin: R hand deep infection, tenosnovitis, L arm w discrete sores   Neurologic: Normal strength, no focal deficit appreciated     Data Review:    LABS:   Lab Results   Component Value Date    WBC 14.2 01/25/2024    HGB 13.6 01/25/2024    HCT 40.6 01/25/2024    .0 01/25/2024    CREATSERUM 0.82 01/25/2024    BUN 19 01/25/2024     01/25/2024    K 3.8 01/25/2024     01/25/2024    CO2 27.0 01/25/2024     01/25/2024    CA 8.5 01/25/2024    ALB 3.4 01/25/2024    ALKPHO 63 01/25/2024    BILT 0.4 01/25/2024    TP 7.2 01/25/2024    AST 18 01/25/2024    ALT  01/25/2024      Comment:      Due to  backorder we are temporarily unable to offer hospital-based ALT testing at [facility].   If urgently needed, please order ALT test code [id number].   The new order will need a new venipuncture and will be sent to [facility] for testing.   The expected turnaround time will be within 24 hours.     ESRML 14 01/25/2024    CRP 6.19 01/25/2024       CXR: image personally reviewed.      Radiology: CTA UPPER EXTREMITY LEFT(IV ONLY)(CPT=73206)    Result Date: 1/25/2024  PROCEDURE:  CTA UPPER EXTREMITY LEFT (IV ONLY) (CPT=73206)  COMPARISON:  [facility], XR, XR HAND (MIN 3 VIEWS), LEFT (CPT=73130), 1/25/2024, 9:26 AM.  [facility], XR, XR HAND (MIN 3 VIEWS), RIGHT (CPT=73130), 1/25/2024, 9:28 AM.  INDICATIONS:  skin infection  TECHNIQUE:  CT images were obtained without and with non-ionic intravenous contrast material. Multi-planar reformatted/3-D images were created to optimize visualization of vascular anatomy.   Dose reduction techniques were used. Dose information is transmitted to the ACR (American College of Radiology) NRDR (National Radiology Data Registry) which includes the Dose Index Registry.    PATIENT STATED HISTORY:(As  transcribed by Technologist)  Patient has left hand swelling from a one week old tattoo.   CONTRAST USED:  100cc of Omnipaque 350  FINDINGS:  REGION:  Left upper extremity. ARTERIES:  No significant obstruction or occlusion.  OTHER:  No aggressive cortical destructive process involving the humerus, radius, ulna, carpal bones or metacarpals to suggest osteomyelitis.  There is some soft tissue swelling and subcutaneous soft tissue stranding along the posterior lateral aspect of  the mid to distal forearm extending to the left hand..            CONCLUSION:  No evidence of arterial occlusion to the level of the hand.  Soft tissue swelling with subcutaneous edema involving the mid to distal posterior lateral forearm and dorsal aspect of the carpus.  This is nonspecific although suggestive of cellulitis.  No discrete peripheral drainable fluid collection.  If there is persistent clinical concern for osteomyelitis,  MRI with contrast may be helpful for further evaluation.    LOCATION:  [id number]    Dictated by (CST): Guillermina Dyson MD on 1/25/2024 at 1:05 PM     Finalized by (CST): Guillermina Dyson MD on 1/25/2024 at 1:13 PM       XR HAND (MIN 3 VIEWS), RIGHT (CPT=73130)    Result Date: 1/25/2024  PROCEDURE:  XR HAND (MIN 3 VIEWS), RIGHT (CPT=73130)  TECHNIQUE:  Three views of the right hand were obtained.  COMPARISON:  [facility], XR, XR HAND (MIN 3 VIEWS), LEFT (CPT=73130), 1/25/2024, 9:26 AM.  INDICATIONS:  skin infection  PATIENT STATED HISTORY: (As transcribed by Technologist)  Patient has redness and swelling throughout the proximal 3rd digit for a few days which he says might be an infection from an ingrown hair.  He has severe pain radiating throughout the entire hand  and up the right arm.  Patient is also having redness and swelling to his left hand and states he had a tattoo done on the left hand about 4-5 days ago.  Patient has limited range of motion and is unable to fully extend his hand and fingers.    FINDINGS:   There is soft tissue swelling along the dorsum of the hand and 3rd digit which can be seen in cellulitis.  There is no evidence of gas within the soft tissues.  The bones demonstrate no fracture dislocation.  No lytic or blastic lesion.            CONCLUSION:  Dorsal soft tissue swelling of the hand can be seen in cellulitis.  There is no bony abnormality to suggest osteomyelitis by plain film.   LOCATION:  [facility]   Dictated by (CST): Ciro Delgado MD on 1/25/2024 at 10:15 AM     Finalized by (CST): Ciro Delgado MD on 1/25/2024 at 10:17 AM       XR HAND (MIN 3 VIEWS), LEFT (CPT=73130)    Result Date: 1/25/2024  PROCEDURE:  XR HAND (MIN 3 VIEWS), LEFT (CPT=73130)  TECHNIQUE:  Three views of the left hand were obtained.  COMPARISON:  None.  INDICATIONS:  skin infection  PATIENT STATED HISTORY: (As transcribed by Technologist)  Patient had a tattoo done on his left hand about 4-5 days ago and has been experiencing pain throughout the hand.  He also has abrasion-like wounds on the dorsal hand.    FINDINGS:  There is no fracture, dislocation, or subluxation.  There is no lytic or blastic lesions.  The soft tissues are unremarkable.  The alignment of the bones is within normal limits.            CONCLUSION:  No acute disease.    LOCATION:  [facility]   Dictated by (CST): Ciro Delgado MD on 1/25/2024 at 10:13 AM     Finalized by (CST): Ciro Delgado MD on 1/25/2024 at 10:13 AM          Assessment/Plan:     R hand cellultis w deep infection  Ho MRSA  - cont IV vanc, ortho hand to eval for likely I and D  - cont home narcotic for now  - bowel regimen    # anxiety  - cont Klonoprin    SCDs      Outpatient records or previous hospital records reviewed.   [facility] hospitalist to continue to follow patient while in house  A total of 75  minutes taken with patient and coordinating care.  Greater than 50% face to face encounter.      Silas Corley MD  [facility] Hospitalist  [phone]    **Certification      PHYSICIAN  Certification of Need for Inpatient Hospitalization - Initial Certification    Patient will require inpatient services that will reasonably be expected to span two midnight's based on the clinical documentation in H+P.   Based on patients current state of illness, I anticipate that, after discharge, patient will require TBD.

## 2024-01-25 NOTE — PROGRESS NOTES
A&Ox4. VSS. Afebrile. Spo2>90% on RA. Tele-NSR. NPO. Patient c/o of pain from swelling on right hand, awaiting admission orders. Up ad rebel. Patient denies any n/v/d or SOB. Updated on POC. All questions answered at this time. Call light within reach.     Patient stated that he has oxycodone tablets with him. This RN informed patient that I would need to take them to security since they are a controlled substance. Patient initially declined but later agreed when MD was present. Charge nurse notified, security informed.

## 2024-01-25 NOTE — PROGRESS NOTES
[facility] Pharmacy Dosing Service      Initial Pharmacokinetic Consult for Vancomycin Dosing     Melchor Laws is a 43 year old male who is being initiated on vancomycin therapy for cellulitis.  Pharmacy has been asked to dose vancomycin by Viktor Johnson.  The initial treatment and monitoring approach will be steady state AUC strategy.        Weight and Temperature:    Wt Readings from Last 1 Encounters:   24 88.5 kg (195 lb)        Temp Readings from Last 1 Encounters:   24 98.3 °F (36.8 °C) (Oral)      Labs:   Recent Labs   Lab 24  0827   CREATSERUM 0.82      Estimated Creatinine Clearance: 132.6 mL/min (based on SCr of 0.82 mg/dL).     Recent Labs   Lab 24  0827   WBC 14.2*          The Pharmacokinetic Target is:     to 600 mg-h/L and trough <=15 mg/L    Renal Dosing Considerations:    None     Assessment/Plan:   Initial/Loading dose: Has received 2000 mg IV (20 mg/kg, capped at 2250 mg) x 1 loading dose. Given in the Emergency room.      Maintenance dose: Pharmacy will dose vancomycin at 1250 mg IV every 12 hours    Monitorin) Plan for vancomycin peak and trough to be obtained at steady state    2) Pharmacy will order SCr as clinically indicated to assess renal function.    3) Pharmacy will monitor for toxicity and efficacy, adjust vancomycin dose and/or frequency, and order vancomycin levels as appropriate per the Pharmacy and Therapeutics Committee approved protocol until discontinuation of the medication.       We appreciate the opportunity to assist in the care of this patient.     Pedro Vega, PharmD  2024  4:31 PM  [facility] Extension: [phone]

## 2024-01-26 LAB
ANION GAP SERPL CALC-SCNC: 2 MMOL/L (ref 0–18)
BASOPHILS # BLD AUTO: 0.04 X10(3) UL (ref 0–0.2)
BASOPHILS NFR BLD AUTO: 0.3 %
BUN BLD-MCNC: 11 MG/DL (ref 9–23)
CALCIUM BLD-MCNC: 8.4 MG/DL (ref 8.5–10.1)
CHLORIDE SERPL-SCNC: 107 MMOL/L (ref 98–112)
CO2 SERPL-SCNC: 27 MMOL/L (ref 21–32)
CREAT BLD-MCNC: 0.78 MG/DL
CRP SERPL-MCNC: 5.59 MG/DL (ref ?–0.3)
EGFRCR SERPLBLD CKD-EPI 2021: 114 ML/MIN/1.73M2 (ref 60–?)
EOSINOPHIL # BLD AUTO: 0.02 X10(3) UL (ref 0–0.7)
EOSINOPHIL NFR BLD AUTO: 0.1 %
ERYTHROCYTE [DISTWIDTH] IN BLOOD BY AUTOMATED COUNT: 12.8 %
GLUCOSE BLD-MCNC: 165 MG/DL (ref 70–99)
HCT VFR BLD AUTO: 41.6 %
HGB BLD-MCNC: 14 G/DL
IMM GRANULOCYTES # BLD AUTO: 0.07 X10(3) UL (ref 0–1)
IMM GRANULOCYTES NFR BLD: 0.5 %
LYMPHOCYTES # BLD AUTO: 1.25 X10(3) UL (ref 1–4)
LYMPHOCYTES NFR BLD AUTO: 8.3 %
MCH RBC QN AUTO: 31.3 PG (ref 26–34)
MCHC RBC AUTO-ENTMCNC: 33.7 G/DL (ref 31–37)
MCV RBC AUTO: 92.9 FL
MONOCYTES # BLD AUTO: 0.67 X10(3) UL (ref 0.1–1)
MONOCYTES NFR BLD AUTO: 4.4 %
NEUTROPHILS # BLD AUTO: 13.1 X10 (3) UL (ref 1.5–7.7)
NEUTROPHILS # BLD AUTO: 13.1 X10(3) UL (ref 1.5–7.7)
NEUTROPHILS NFR BLD AUTO: 86.4 %
OSMOLALITY SERPL CALC.SUM OF ELEC: 285 MOSM/KG (ref 275–295)
PLATELET # BLD AUTO: 321 10(3)UL (ref 150–450)
POTASSIUM SERPL-SCNC: 3.7 MMOL/L (ref 3.5–5.1)
RBC # BLD AUTO: 4.48 X10(6)UL
SODIUM SERPL-SCNC: 136 MMOL/L (ref 136–145)
WBC # BLD AUTO: 15.2 X10(3) UL (ref 4–11)

## 2024-01-26 PROCEDURE — 86140 C-REACTIVE PROTEIN: CPT | Performed by: STUDENT IN AN ORGANIZED HEALTH CARE EDUCATION/TRAINING PROGRAM

## 2024-01-26 PROCEDURE — 85025 COMPLETE CBC W/AUTO DIFF WBC: CPT | Performed by: STUDENT IN AN ORGANIZED HEALTH CARE EDUCATION/TRAINING PROGRAM

## 2024-01-26 PROCEDURE — 80048 BASIC METABOLIC PNL TOTAL CA: CPT | Performed by: STUDENT IN AN ORGANIZED HEALTH CARE EDUCATION/TRAINING PROGRAM

## 2024-01-26 NOTE — ANESTHESIA POSTPROCEDURE EVALUATION
[facility]    Melchor Laws Patient Status:  Inpatient   Age/Gender 43 year old male MRN [id number]   Location [facility] SURGERY Attending Deidre Jaimes MD   Hosp Day # 0 PCP Stalin Lopes MD       Anesthesia Post-op Note    IRRIGATION & DEBRIDEMENT RIGHT HAND    Procedure Summary       Date: 01/25/24 Room / Location:  MAIN OR 05 / [facility] MAIN OR    Anesthesia Start: 2055 Anesthesia Stop: 2144    Procedure: IRRIGATION & DEBRIDEMENT RIGHT HAND (Right: Hand) Diagnosis:       Infection      (Infection [B99.9])    Surgeons: Sushma Villavicencio MD Anesthesiologist: Myriam Rodriguez MD    Anesthesia Type: general ASA Status: 2            Anesthesia Type: general    Vitals Value Taken Time   /75 01/25/24 2145   Temp 98.1 01/25/24 2145   Pulse 106 01/25/24 2145   Resp 14 01/25/24 2145   SpO2 96 01/25/24 2145       Patient Location: PACU    Anesthesia Type: general    Airway Patency: patent    Postop Pain Control: adequate    Mental Status: mildly sedated but able to meaningfully participate in the post-anesthesia evaluation    Nausea/Vomiting: none    Cardiopulmonary/Hydration status: stable euvolemic    Complications: no apparent anesthesia related complications    Postop vital signs: stable    Dental Exam: Unchanged from Preop

## 2024-01-26 NOTE — PLAN OF CARE
Patient is A/Ox4. VSS. Afebrile. Patient c/o pain. See MAR. Patient is on RA. . No Cough/SOB. on Tele NSR. Refused Heparin. SCD on. Electrolyte protocol. Regular Diet. Denies any N/V/Diarrhea. IV ABX. Wound care TID left hand. Patient and family updated on plan of care.    Problem: Patient/Family Goals  Goal: Patient/Family Long Term Goal  Description: Patient's Long Term Goal: discharge    Interventions:  - follow and update POC  - See additional Care Plan goals for specific interventions  Outcome: Progressing  Goal: Patient/Family Short Term Goal  Description: Patient's Short Term Goal:   1/25 NOC: sleep    Interventions:   - cluster care  - pain meds  - See additional Care Plan goals for specific interventions  Outcome: Progressing

## 2024-01-26 NOTE — ANESTHESIA PROCEDURE NOTES
Airway  Date/Time: 1/25/2024 9:04 PM  Urgency: elective    Airway not difficult    General Information and Staff    Patient location during procedure: OR  Anesthesiologist: Briana Brewer MD  Performed: anesthesiologist   Performed by: Briana Brewer MD  Authorized by: Briana Brewer MD      Indications and Patient Condition  Indications for airway management: anesthesia  Spontaneous Ventilation: absent  Sedation level: deep  Preoxygenated: yes  Patient position: sniffing  Mask difficulty assessment: 0 - not attempted    Final Airway Details  Final airway type: supraglottic airway      Successful airway: classic  Size 5       Number of attempts at approach: 1  Number of other approaches attempted: 0

## 2024-01-26 NOTE — CONSULTS
Infectious Disease Initial Consultation      Date of admission: 1/25/2024  7:46 AM     Date of service: 01/26/24 10:33 AM    Consult requested by: Deidre Jaimes MD     Reason for consult: Right middle finger abscess    Chief complaint: Right middle finger redness    History of present illness: Melchor Laws is a 43 year old male with history of ankylosing spondylitis, not on any medications, presenting here with a right middle finger abscess.    In the emergency room, he was afebrile, hemodynamically stable.  Labs revealed leukocytosis white count 14.2 [place].  CRP was elevated 6.19.  UA without UTI.  CMP unremarkable.  Patient underwent a CAT scan of the left upper extremity does show soft tissue swelling with subcutaneous edema involving the mid to distal posterior lateral forearm and dorsal aspect of the carpus.  No evidence of osteomyelitis.  Patient was eval by orthopedics and was taken to the OR last night.  There was no osteomyelitis or involvement of the joints noted intraoperatively.  Surgical cultures were sent.  2 GPC's noted.  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     Past Medical History:   Diagnosis Date    Ankylosing spondylitis (HCC)     Esophageal reflux EGD 2008    Gout      Past Surgical History:   Procedure Laterality Date    COLONOSCOPY  ~2008    LASIK      OTHER SURGICAL HISTORY      shoulder right repair    SHOULDER ARTHROSCOPY Right 2013    UPPER GI ENDOSCOPY PERFORMED  ~2008     Social History     Socioeconomic History    Marital  status: Single   Tobacco Use    Smoking status: Never    Smokeless tobacco: Never   Vaping Use    Vaping Use: Every day   Substance and Sexual Activity    Alcohol use: Yes     Comment: occasionally    Drug use: Yes     Types: Cannabis, Cocaine     Comment: last use 4/27     Social Determinants of Health     Food Insecurity: No Food Insecurity (1/25/2024)    Food Insecurity     Food Insecurity: Never true   Transportation Needs: No Transportation Needs (1/25/2024)    Transportation Needs     Lack of Transportation: No   Housing Stability: Low Risk  (1/25/2024)    Housing Stability     Housing Instability: No     History reviewed. No pertinent family history.  Reviewed, see above    Medications:    pantoprazole    OxyCODONE HCl IR    acetaminophen    melatonin    ondansetron    prochlorperazine    heparin    vancomycin    morphINE **OR** morphINE **OR** morphINE    clonazePAM     Allergies:  No Known Allergies    Physical Exam:  Vitals:    01/26/24 0733   BP: 129/84   Pulse: 90   Resp: 20   Temp: 97.5 °F (36.4 °C)     Vitals signs and nursing note reviewed.   Constitutional:       Appearance: Normal appearance.   HENT:      Head: Normocephalic and atraumatic.      Mouth: Mucous membranes are moist.   Neck:      Musculoskeletal: Neck supple.   Cardiovascular:      Rate and Rhythm: Normal rate.   Pulmonary:      Effort: Pulmonary effort is normal. No respiratory distress.   Musculoskeletal:      Right lower leg: No edema.      Left lower leg: No edema.      Right hand including dressing noted  Skin:     General: Skin is warm and dry.   Neurological:      General: No focal deficit present.      Mental Status: Alert and oriented to person, place, and time.     Laboratory data:  I have independently reviewed all lab results; including old microbiological results.  Lab Results   Component Value Date    WBC 15.2 01/26/2024    HGB 14.0 01/26/2024    HCT 41.6 01/26/2024    .0 01/26/2024    CREATSERUM 0.78 01/26/2024     BUN 11 01/26/2024     01/26/2024    K 3.7 01/26/2024     01/26/2024    CO2 27.0 01/26/2024     01/26/2024    CA 8.4 01/26/2024    CRP 5.59 01/26/2024        Recent Labs   Lab 01/26/24  0810   RBC 4.48   HGB 14.0   HCT 41.6   MCV 92.9   MCH 31.3   MCHC 33.7   RDW 12.8   NEPRELIM 13.10*   WBC 15.2*   .0       Microbiology data:  Hospital Encounter on 01/25/24   1. Aerobic Bacterial Culture     Status: None (Preliminary result)    Collection Time: 01/25/24  9:12 PM    Specimen: Hand, right; Abscess   Result Value Ref Range    Aerobic Smear 1+ WBCs seen N/A    Aerobic Smear No organisms seen N/A         Radiology:  I have reviewed all imaging data available independently.   Hand x-ray:  Soft tissue swelling consistent with cellulitis    Impression:  Melchor Laws is a 43 year old male with     Right middle finger infection  Status post debridement on 1/25/2024  No involvement of bone or joint  Cultures pending, Gram stain with GPC's  Currently on IV vancomycin  History of ankylosing spondylitis  Not on any medications    Recommendations:     Continue to follow-up on cultures  Continue IV vancomycin in the meantime  Consider discharge on oral regimen pending culture data  Wound care as per orthopedics  Continue to monitor daily labs for antibiotic toxicity  Further recommendations will depend on the above work-up and clinical progress     The plan of care was discussed with the primary hospital team, Deidre Jaimes MD     Recommendations were also discussed with the patient; all questions were answered.     Thank you for this consultation. Please don't hesitate to call the ID team for questions or any acute changes in patient's clinical condition.    Please note that this report has been produced using speech recognition software and may contain errors related to that system including, but not limited to, errors in grammar, punctuation, and spelling, as well as words and phrases that  possibly may have been recognized inappropriately.  If there are any questions or concerns, contact the dictating provider for clarification.    The  Century Cures Act makes medical notes like these available to patients in the interest of transparency. Please be advised this is a medical document. Medical documents are intended to carry relevant information, facts as evident, and the clinical opinion of the practitioner. The medical note is intended as peer to peer communication and may appear blunt or direct. It is written in medical language and may contain abbreviations or verbiage that are unfamiliar.     Shayan Winchester MD  [facility]. Tel: [phone]. Fax: [phone].     Melchor TALISHA Laws : 1980 MRN: [id number] CSN: [id number]

## 2024-01-26 NOTE — PROGRESS NOTES
ORTHOPEDIC SURGERY PROGRESS NOTE  Ortho Attending: Sushma Villavicencio MD    SUBJECTIVE:  Patient is doing well this morning. Pain is controlled and improving. Mild paresthesias.     OBJECTIVE:  Vitals:    01/26/24 1128   BP: 135/72   Pulse: 89   Resp: 19   Temp: 97.6 °F (36.4 °C)     NAD, appears comfortable  Splint taken down with dressings - volar incision with sutures in place, loosely closed dorsal incision about the middle finger, serosanguinous drainage, packing removed   Erythema and swelling is improved  FDS/FDP and extension of the digit is intact, digits are stiff in full extension and flexion   NVI in M/U/R distributions   WWP, BCR, 2+ RP    ASSESSMENT AND PLAN:  This is a 43M who is POD1 s/p R MF abscess I&D.     Three times daily soaks and dressing changes with nursing   PWB soft dressing with encouragement of motion  Follow up cultures - OR cultures are NGTD  Abx per ID, appreciate recommendations   Multimodal pain control  Trend WBC and CBC  Daily OT for digit ROM    Thank you for allowing me to participate in this patient’s care.    Sushma Villavicencio MD  [facility]  Hand and Upper Extremity Specialist

## 2024-01-26 NOTE — CDS QUERY
Clinical Documentation Clarification Form  Dear Dr Villavicencio,   Clinical information (provided below) indicates a Incision and Drainage (I&D) procedure was done with debridement.  PLEASE “X” THE MOST APPROPRIATE STATEMENT FOR EACH REQUEST THAT APPLIES TO THE procedure performed on 01/25/2023  Clarification Request #1: Type of Procedure  [   ] Incision and Drainage without surgical debridement.    [   ] I&D with Non Excisional Debridement (non-operative scraping, brushing, irrigating, scrubbing, or washing of devitalized tissue, necrosis, or slough; minor removal of loose fragments)  [x] I&D with Excisional Debridement (surgical removal or cutting away of devitalized tissue, necrosis, or slough to the level of viable tissue)   -If Excisional Debridement is chosen above, the following requests must be clarified:  Clarification request #2: Description of the tissue removed   [x] Necrotic   [x] Devitalized  [    ] Non-viable   [    ] Other ___________________  Clarification #3: Appearance of the wound after procedure;   [x] Fresh bleeding tissue   [    ] Viable tissue  [    ] Pink healthy tissue   [    ] Other: ___________________  Clarification #4: Size of wound after debridement in cm: Length 2 cm x Width 1 cm x Depth 1cm ___                       Documentation from the Medical Record:    01/25: Operative report: I made a dorsal curvilinear incision along the radial border of the proximal phalanx. Hemostasis was archived with bipolar cautery. Incision was made through skin and subcutaneous tissue with a 15 blade. Immediate purulence was encountered. Cultures were taken. The patient received a dose of antibiotics. I decompressed the abscess. There was also significant phlegmon and thrombosed veins that were sharply excised. The subcutaneous tissue was debrided with a curette. I explored the dorsal soft tissues superficial to the extensor tendon as well as volar and the purulence did track somewhat volar. I therefore made  a small counter incision over the volar aspect of the proximal phalanx in a Brunner fashion. The tissue here was healthy. I also placed a hemostat around the bone dorsally and spread in the volar tissues and the purulence did not track this deeply. I then copiously irrigated both wounds. The tourniquet was let down for the time noted above. Hemostasis was achieved with bipolar cautery. The volar wound was closed with 4-0 Nylon given the absence of infection here. The dorsal wound was packing and very loosely closed with two 4-0 Nylon sutures.   For questions regarding this query contact Clinical Documentation : Deepti Peralta [phone]  THIS FORM IS A PERMANENT PART OF THE MEDICAL RECORD

## 2024-01-26 NOTE — PROGRESS NOTES
[facility]     Hospitalist Progress Note     Melchor Laws Patient Status:  Inpatient    1980 MRN [id number]   Location [facility] 5NW-A Attending Deidre Jaimes MD   Hosp Day # 1 PCP Stalin Lopes MD     Chief Complaint: R middle finger abscess, hand cellulitis    Subjective:     Patient sp debridement     Afebrile.  Post surgical pain but pain overall improved.      Objective:    Review of Systems:   A comprehensive review of systems was completed; pertinent positive and negatives stated in subjective.    Vital signs:  Temp:  [97.5 °F (36.4 °C)-98.9 °F (37.2 °C)] 97.6 °F (36.4 °C)  Pulse:  [] 90  Resp:  [9-20] 20  BP: (121-139)/(65-95) 135/72  SpO2:  [95 %-99 %] 96 %    Physical Exam:    General: No acute distress  Respiratory: No wheezes, no rhonchi  Cardiovascular: S1, S2, regular rate and rhythm  Abdomen: Soft, Non-tender, non-distended, positive bowel sounds  Neuro: No new focal deficits.   Extremities: R hand wrapped       Diagnostic Data:    Labs:  Recent Labs   Lab 24  0827 24  0810   WBC 14.2* 15.2*   HGB 13.6 14.0   MCV 93.1 92.9   .0 321.0       Recent Labs   Lab 24  0827 24  0810   * 165*   BUN 19 11   CREATSERUM 0.82 0.78   CA 8.5 8.4*   ALB 3.4  --    * 136   K 3.8 3.7    107   CO2 27.0 27.0   ALKPHO 63  --    AST 18  --    BILT 0.4  --    TP 7.2  --        Microbiology    Hospital Encounter on 24   1. Aerobic Bacterial Culture     Status: None (Preliminary result)    Collection Time: 24  9:12 PM    Specimen: Hand, right; Abscess   Result Value Ref Range    Aerobic Smear 1+ WBCs seen N/A    Aerobic Smear No organisms seen N/A         Imaging: Reviewed in Epic.    Medications:    pantoprazole  40 mg Oral QAM AC    heparin  5,000 Units Subcutaneous Q8H CHEPE    vancomycin  15 mg/kg Intravenous Q12H       Assessment & Plan:      #  R hand cellultis/RM finger abscess  Ho MRSA  - cont IV vanc  - sp debridement ,  fu intra-op cultures.  ID to manage abx  - cont home narcotic for now  - bowel regimen     # anxiety  - cont Klonoprin     SCDs              Viktor Corley MD    Supplementary Documentation:     Quality:  DVT Mechanical Prophylaxis:   SCDs, Early ambuation  DVT Pharmacologic Prophylaxis   Medication    heparin (Porcine) 5000 UNIT/ML injection 5,000 Units                Code Status: Not on file  Joe: No urinary catheter in place  Joe Duration (in days):   Central line:    CHRISSY:     Discharge is dependent on: ilya  At this point Mr. Laws is expected to be discharge to: home    The 21st Century Cures Act makes medical notes like these available to patients in the interest of transparency. Please be advised this is a medical document. Medical documents are intended to carry relevant information, facts as evident, and the clinical opinion of the practitioner. The medical note is intended as peer to peer communication and may appear blunt or direct. It is written in medical language and may contain abbreviations or verbiage that are unfamiliar.

## 2024-01-26 NOTE — PLAN OF CARE
Pt is A&Ox4. VSS, afebrile. Maintaining O2 sats WDL. Tele, NSR. Heparin. EP. General diet. Voids. Up ad rebel. C/o R hand pain, s/p I&D- see MAR. Dressing c/d/I. PIV, IV ABX. No further needs at this time, continue POC. Safety precautions in place.    Problem: Patient/Family Goals  Goal: Patient/Family Long Term Goal  Description: Patient's Long Term Goal: discharge    Interventions:  - follow and update POC  - See additional Care Plan goals for specific interventions  Outcome: Progressing  Goal: Patient/Family Short Term Goal  Description: Patient's Short Term Goal:   1/25 NOC: sleep    Interventions:   - cluster care  - pain meds  - See additional Care Plan goals for specific interventions  Outcome: Progressing

## 2024-01-26 NOTE — ANESTHESIA PREPROCEDURE EVALUATION
PRE-OP EVALUATION    Patient Name: Melchor Laws    Admit Diagnosis: MRSA (methicillin resistant Staphylococcus aureus) infection [A49.02]  Cellulitis, unspecified cellulitis site [L03.90]    Pre-op Diagnosis: Infection [B99.9]    IRRIGATION & DEBRIDEMENT RIGHT HAND    Anesthesia Procedure: IRRIGATION & DEBRIDEMENT RIGHT HAND (Right: Hand)    Surgeon(s) and Role:     * Sushma Villavicencio MD - Primary    Pre-op vitals reviewed.  Temp: 98.1 °F (36.7 °C)  Pulse: 87  Resp: 18  BP: 136/79  SpO2: 98 %  Body mass index is 25.73 kg/m².    Current medications reviewed.  Hospital Medications:   [COMPLETED] sodium chloride 0.9 % IV bolus 2,655 mL  30 mL/kg Intravenous Once    [] HYDROmorphone (Dilaudid) 1 MG/ML injection 0.5 mg  0.5 mg Intravenous Q30 Min PRN    [COMPLETED] HYDROmorphone (Dilaudid) 1 MG/ML injection 0.5-1 mg  0.5-1 mg Intravenous Q30 Min PRN    [COMPLETED] vancomycin (Vancocin) 1 g injection        [COMPLETED] iohexol (Omnipaque) 350 MG/ML injection via power injector 100 mL  100 mL Intravenous ONCE PRN    [COMPLETED] sodium chloride 0.9% infusion   Intravenous Once    [START ON 2024] pantoprazole (Protonix) DR tab 40 mg  40 mg Oral QAM AC    oxyCODONE immediate release tab 30 mg  30 mg Oral Q8H PRN    acetaminophen (Tylenol Extra Strength) tab 500 mg  500 mg Oral Q4H PRN    melatonin tab 3 mg  3 mg Oral Nightly PRN    ondansetron (Zofran) 4 MG/2ML injection 4 mg  4 mg Intravenous Q6H PRN    prochlorperazine (Compazine) 10 MG/2ML injection 5 mg  5 mg Intravenous Q8H PRN    sodium chloride 0.9% infusion   Intravenous Continuous    heparin (Porcine) 5000 UNIT/ML injection 5,000 Units  5,000 Units Subcutaneous Q8H CHEPE    vancomycin (Vancocin) 1.25 g in sodium chloride 0.9% 250mL IVPB premix  15 mg/kg Intravenous Q12H    morphINE PF 2 MG/ML injection 1 mg  1 mg Intravenous Q2H PRN    Or    morphINE PF 2 MG/ML injection 2 mg  2 mg Intravenous Q2H PRN    Or    morphINE PF 4 MG/ML injection 4 mg  4  mg Intravenous Q2H PRN    clonazePAM (KlonoPIN) tab 1 mg  1 mg Oral TID PRN       Outpatient Medications:     Medications Prior to Admission   Medication Sig Dispense Refill Last Dose    Omeprazole 40 MG Oral Capsule Delayed Release Take 1 capsule (40 mg total) by mouth daily. 90 capsule 3 2024 at 0600    OxyCODONE HCl IR 30 MG Oral Tab Take 1 tablet (30 mg total) by mouth every 8 (eight) hours as needed.   2024 at 0600    ClonazePAM 1 MG Oral Tab TAKE 1 TABLET BY MOUTH THREE TIMES DAILY AS NEEDED FOR ANXIETY 90 tablet 0 2024 at 0600    allopurinol 100 MG Oral Tab Take 1 tablet (100 mg total) by mouth daily. (Patient not taking: Reported on 2024)   Not Taking    [] cyclobenzaprine 10 MG Oral Tab Take 1 tablet (10 mg total) by mouth 3 (three) times daily as needed for Muscle spasms. (Patient not taking: Reported on 2024) 20 tablet 0 Not Taking       Allergies: Patient has no known allergies.      Anesthesia Evaluation    Patient summary reviewed.    Anesthetic Complications  (-) history of anesthetic complications         GI/Hepatic/Renal      (+) GERD       (-) chronic renal disease   (-) liver disease                 Cardiovascular        Exercise tolerance: good     MET: >4      (-) hypertension   (-) hyperlipidemia  (-) CAD                                Endo/Other      (-) diabetes     (-) hypothyroidism  (-) hyperthyroidism                     Pulmonary    Negative pulmonary ROS.                       Neuro/Psych    Negative neuro/psych ROS.                                  Past Surgical History:   Procedure Laterality Date    COLONOSCOPY  ~    LASIK      OTHER SURGICAL HISTORY      shoulder right repair    SHOULDER ARTHROSCOPY Right 2013    UPPER GI ENDOSCOPY PERFORMED  ~     Social History     Socioeconomic History    Marital status: Single   Tobacco Use    Smoking status: Never    Smokeless tobacco: Never   Vaping Use    Vaping Use: Every day   Substance and Sexual  Activity    Alcohol use: Yes     Comment: occasionally    Drug use: Yes     Types: Cannabis, Cocaine     Comment: last use 4/27     History   Drug Use    Types: Cannabis, Cocaine     Comment: last use 4/27     Available pre-op labs reviewed.  Lab Results   Component Value Date    WBC 14.2 (H) 01/25/2024    RBC 4.36 01/25/2024    HGB 13.6 01/25/2024    HCT 40.6 01/25/2024    MCV 93.1 01/25/2024    MCH 31.2 01/25/2024    MCHC 33.5 01/25/2024    RDW 13.1 01/25/2024    .0 01/25/2024     Lab Results   Component Value Date     (L) 01/25/2024    K 3.8 01/25/2024     01/25/2024    CO2 27.0 01/25/2024    BUN 19 01/25/2024    CREATSERUM 0.82 01/25/2024     (H) 01/25/2024    CA 8.5 01/25/2024            Airway      Mallampati: III  Mouth opening: >3 FB  TM distance: > 6 cm  Neck ROM: full Cardiovascular      Rhythm: regular  Rate: normal     Dental             Pulmonary      Breath sounds clear to auscultation bilaterally.               Other findings              ASA: 2   Plan: general  NPO status verified and patient meets guidelines.  Patient has not taken beta blockers in last 24 hours.  Post-procedure pain management plan discussed with surgeon and patient.    Comment: I spoke with the patient and discussed the risks of general anesthesia, which include allergic reaction, nausea, vomiting, dental injury, sore throat, awareness, hypotension, as well as more serious cardiac and pulmonary complications. The patient understands and consents to receiving general anesthesia for this procedure.    Plan/risks discussed with: patient                Present on Admission:  **None**

## 2024-01-27 VITALS
HEART RATE: 79 BPM | OXYGEN SATURATION: 98 % | DIASTOLIC BLOOD PRESSURE: 83 MMHG | RESPIRATION RATE: 20 BRPM | SYSTOLIC BLOOD PRESSURE: 136 MMHG | BODY MASS INDEX: 25.84 KG/M2 | HEIGHT: 73 IN | TEMPERATURE: 98 F | WEIGHT: 195 LBS

## 2024-01-27 LAB
BASOPHILS # BLD AUTO: 0.05 X10(3) UL (ref 0–0.2)
BASOPHILS NFR BLD AUTO: 0.5 %
CRP SERPL-MCNC: 2.29 MG/DL (ref ?–0.3)
EOSINOPHIL # BLD AUTO: 0.14 X10(3) UL (ref 0–0.7)
EOSINOPHIL NFR BLD AUTO: 1.3 %
ERYTHROCYTE [DISTWIDTH] IN BLOOD BY AUTOMATED COUNT: 12.6 %
HCT VFR BLD AUTO: 39.6 %
HGB BLD-MCNC: 13.6 G/DL
IMM GRANULOCYTES # BLD AUTO: 0.04 X10(3) UL (ref 0–1)
IMM GRANULOCYTES NFR BLD: 0.4 %
LYMPHOCYTES # BLD AUTO: 1.6 X10(3) UL (ref 1–4)
LYMPHOCYTES NFR BLD AUTO: 14.8 %
MCH RBC QN AUTO: 30.9 PG (ref 26–34)
MCHC RBC AUTO-ENTMCNC: 34.3 G/DL (ref 31–37)
MCV RBC AUTO: 90 FL
MONOCYTES # BLD AUTO: 0.76 X10(3) UL (ref 0.1–1)
MONOCYTES NFR BLD AUTO: 7.1 %
NEUTROPHILS # BLD AUTO: 8.19 X10 (3) UL (ref 1.5–7.7)
NEUTROPHILS # BLD AUTO: 8.19 X10(3) UL (ref 1.5–7.7)
NEUTROPHILS NFR BLD AUTO: 75.9 %
PLATELET # BLD AUTO: 310 10(3)UL (ref 150–450)
RBC # BLD AUTO: 4.4 X10(6)UL
WBC # BLD AUTO: 10.8 X10(3) UL (ref 4–11)

## 2024-01-27 PROCEDURE — 86140 C-REACTIVE PROTEIN: CPT | Performed by: STUDENT IN AN ORGANIZED HEALTH CARE EDUCATION/TRAINING PROGRAM

## 2024-01-27 PROCEDURE — 85025 COMPLETE CBC W/AUTO DIFF WBC: CPT | Performed by: STUDENT IN AN ORGANIZED HEALTH CARE EDUCATION/TRAINING PROGRAM

## 2024-01-27 RX ORDER — DOXYCYCLINE HYCLATE 100 MG/1
100 CAPSULE ORAL 2 TIMES DAILY
Qty: 42 CAPSULE | Refills: 0 | Status: SHIPPED | OUTPATIENT
Start: 2024-01-27 | End: 2024-02-17

## 2024-01-27 NOTE — PLAN OF CARE
Problem: Patient/Family Goals  Goal: Patient/Family Long Term Goal  Description: Patient's Long Term Goal: discharge    Interventions:  - follow and update POC  - See additional Care Plan goals for specific interventions  Outcome: Progressing  Goal: Patient/Family Short Term Goal  Description: Patient's Short Term Goal:   1/25 NOC: sleep  1/26 NOC: sleep  1/27 less pain to my finger  Interventions:   - cluster care  - pain meds  - See additional Care Plan goals for specific interventions  Outcome: Progressing     Problem: SKIN/TISSUE INTEGRITY - ADULT  Goal: Skin integrity remains intact  Description: INTERVENTIONS  - Assess and document risk factors for pressure ulcer development  - Assess and document skin integrity  - Monitor for areas of redness and/or skin breakdown  - Initiate interventions, skin care algorithm/standards of care as needed  Outcome: Progressing  Goal: Incision(s), wounds(s) or drain site(s) healing without S/S of infection  Description: INTERVENTIONS:  - Assess and document risk factors for pressure ulcer development  - Assess and document skin integrity  - Assess and document dressing/incision, wound bed, drain sites and surrounding tissue  - Implement wound care per orders  - Initiate isolation precautions as appropriate  - Initiate Pressure Ulcer prevention bundle as indicated  Outcome: Progressing

## 2024-01-27 NOTE — PROGRESS NOTES
NURSING DISCHARGE NOTE    Discharged Home via Wheelchair.  Accompanied by Family member  Belongings Taken by patient/family.  Medications from  home Oxycodone locked in Pharmacy was returned to Pt. Discharge instructions and medications, and wound care instructions discussed and given to Pt and Pt;s Mother.

## 2024-01-27 NOTE — PROGRESS NOTES
S: It feels better  Patient reports decreasing pain and swelling  Minimal change in mobility    O: Blood pressure 136/83, pulse 79, temperature 98.1 °F (36.7 °C), temperature source Oral, resp. rate 19, height 6' 1\" (1.854 m), weight 195 lb (88.5 kg), SpO2 98%.       The right hand bandage showed minimal drainage and he is asking to soak the hand as he is due to soak it.    Right middle finger with incisions dorsal and volar  Some maceration volar aspect with loose suture closure  Minimal drainage  Dorsally the incision is healthy appearing without drainage.    He can move in flexion and extension at each joint but is limiting this with stiffness swelling and tenderness.  I encouraged him to move as much as possible - reassured him that this will help with swelling and reduce residual stiffness and will not damage anything.      A: POD #2 incision and drainage right middle finger deep space abscess.    P: D/C Planning for home once ID comfortable with antibiotic plans.  See Dr Villavicencio likely early next week Monday or Tuesday.    [facility]-C

## 2024-01-27 NOTE — PLAN OF CARE
Pt is A&Ox4. VSS, afebrile. Maintaining O2 sats WDL. Tele, NSR. Heparin. EP. General diet. Voids. Up ad rebel. C/o R hand pain, s/p I&D- see MAR. Dressing changed, c/d/I. PIV, IV ABX. No further needs at this time, continue POC. Safety precautions in place.    Problem: Patient/Family Goals  Goal: Patient/Family Long Term Goal  Description: Patient's Long Term Goal: discharge    Interventions:  - follow and update POC  - See additional Care Plan goals for specific interventions  Outcome: Progressing  Goal: Patient/Family Short Term Goal  Description: Patient's Short Term Goal:   1/25 NOC: sleep  1/26 NOC: sleep    Interventions:   - cluster care  - pain meds  - See additional Care Plan goals for specific interventions  Outcome: Progressing

## 2024-01-27 NOTE — DISCHARGE SUMMARY
General Medicine Discharge Summary     Patient ID:  Melchor Laws  43 year old  7/31/1980    Admit date: 1/25/2024    Discharge date and time:1/27/2024      Attending Physician: Deidre Jaimes MD     Primary Care Physician: Stalin Lopes MD     Discharge Diagnoses: MRSA (methicillin resistant Staphylococcus aureus) infection [A49.02]  Cellulitis, unspecified cellulitis site [L03.90]  R middle finger abscess    Primary Diagnosis at discharge from Hospital: Other: MRSA infection; still recommend for TCM follow-up    Please note that only [facility] DMG and EMG patients enrolled in the Medicare ACO, BCBS ACO and [facility] HMOs will be [facility].  For all other patients, please follow usual protocol for discharge care transition.    Secondary Diagnoses: None    Risk of readmission: Melchor Laws has Moderate Risk of readmission after discharge from the hospital.    Discharge Condition: stable    Disposition: home    Important Follow up:  - PCP within   - Consults:     Stalin Lopes MD  [address] [facility]  [address]  [facility] [address] [phone]    Schedule an appointment as soon as possible for a visit in 1 week(s)      Shayan Winchester MD  [address]  [place] [id number] [phone]    Follow up  As needed    Sushma Villavicencio MD  [address] [facility] [address]  [facility] [address] [phone]    Schedule an appointment as soon as possible for a visit on 1/30/2024      - Labs: none  - Radiology: none    Hospital Course:        #  R hand cellultis/RM finger abscess  Ho MRSA  - cont IV vanc- plan dc w po doxy  - sp debridement 1/25, culures + MRSA  - cont home narcotic for dc  - bowel regimen     # anxiety  - cont Klonoprin      Consults: IP CONSULT TO HOSPITALIST  IP CONSULT TO ORTHOPEDIC SURGERY  IP CONSULT TO PHARMACY  IP CONSULT TO INFECTIOUS DISEASE    Operative Procedures: Procedure(s) (LRB):  IRRIGATION &  DEBRIDEMENT RIGHT HAND (Right)       Patient instructions:      Current Discharge Medication List        START taking these medications    Details   doxycycline 100 MG Oral Cap Take 1 capsule (100 mg total) by mouth 2 (two) times daily for 21 days.           CONTINUE these medications which have NOT CHANGED    Details   Omeprazole 40 MG Oral Capsule Delayed Release Take 1 capsule (40 mg total) by mouth daily.      OxyCODONE HCl IR 30 MG Oral Tab Take 1 tablet (30 mg total) by mouth every 8 (eight) hours as needed.      ClonazePAM 1 MG Oral Tab TAKE 1 TABLET BY MOUTH THREE TIMES DAILY AS NEEDED FOR ANXIETY           STOP taking these medications       allopurinol 100 MG Oral Tab        cyclobenzaprine 10 MG Oral Tab              I PERSONALLY RECONCILED CURRENT AND DISCHARGE MEDICATIONS ON DAY OF DISCHARGE      Activity: activity as tolerated  Diet: regular diet  Wound Care: as directed  Code Status: No Order      Exam on day of discharge:     Vitals:    01/27/24 0300   BP: 136/83   Pulse: 79   Resp: 19   Temp: 98.1 °F (36.7 °C)       General: no acute distress, alert and oriented x 3  Heart: RRR  Lungs: clear bilaterally, no active wheezing  Abdomen: nontender, nondistended, intact BS  Extremities: no pedal edema   Neuro: CN inact, no focal deficits      Total time coordinating care for discharge: Greater than 30 minutes    .Silas Corley  [facility]  [phone]

## 2024-01-27 NOTE — DISCHARGE INSTRUCTIONS
Wound care to Right hand.- Dip R hand on soapy warm water soak for 15 mins, let dry, cleanse site with saline, Apply Xerofoam gauze to incision site, cover with gauze and cover hand with ace wrap three times daily.

## 2024-01-27 NOTE — PROGRESS NOTES
Infectious Disease Progress Note      Date of admission: 1/25/2024  7:46 AM     Reason for consult: Right middle finger abscess    Subjective: Feels well.  Pain in control.  No nausea or vomiting.  No diarrhea.    The rest of the systems were reviewed and found to be negative except was mentioned above    Interval events: This is a 43-year-old male patient with history of ankylosing spondylitis, not on any medication, presenting here with right middle finger abscess, status post I&D with orthopedics on 1/25/2024.  Cultures with MRSA.  Currently on IV vancomycin.  Infection noted not to involve bone or joint structures    Medications:    pantoprazole    OxyCODONE HCl IR    acetaminophen    melatonin    ondansetron    prochlorperazine    heparin    vancomycin    morphINE **OR** morphINE **OR** morphINE    clonazePAM     Allergies:  No Known Allergies    Physical Exam:  Vitals:    01/27/24 0300   BP: 136/83   Pulse: 79   Resp: 19   Temp: 98.1 °F (36.7 °C)     Vitals signs and nursing note reviewed.   Constitutional:       Appearance: Normal appearance.   HENT:      Head: Normocephalic and atraumatic.      Mouth: Mucous membranes are moist.   Neck:      Musculoskeletal: Neck supple.   Cardiovascular:      Rate and Rhythm: Normal rate.   Pulmonary:      Effort: Pulmonary effort is normal. No respiratory distress.   Skin:     General: Skin is warm and dry.   Neurological:      General: No focal deficit present.      Mental Status: Alert and oriented to person, place, and time.     Laboratory data:  I have reviewed all the lab results independently.  Lab Results   Component Value Date    WBC 10.8 01/27/2024    HGB 13.6 01/27/2024    HCT 39.6 01/27/2024    .0 01/27/2024      Recent Labs   Lab 01/27/24  0834   RBC 4.40   HGB 13.6   HCT 39.6   MCV 90.0   MCH 30.9   MCHC 34.3   RDW 12.6   NEPRELIM 8.19*   WBC 10.8   .0      Microbiology data:  Hospital Encounter on 01/25/24   1. Aerobic Bacterial Culture      Status: None (Preliminary result)    Collection Time: 01/25/24  9:12 PM    Specimen: Hand, right; Abscess   Result Value Ref Range    Aerobic Smear 1+ WBCs seen N/A    Aerobic Smear No organisms seen N/A   2. Blood Culture     Status: None (Preliminary result)    Collection Time: 01/25/24  8:27 AM    Specimen: Blood,peripheral   Result Value Ref Range    Blood Culture Result No Growth 1 Day N/A      Impression:  Melchor Laws is a 43 year old male with    Right middle finger infection  Status post debridement on 1/25/2024  No involvement of bone or joint  Cultures with MRSA  Currently on IV vancomycin  History of ankylosing spondylitis  Not on any medications    Recommendations:    Discharge patient on doxycycline 100 mg 2 times daily for 21 days  Follow-up with infectious disease later next week  Okay to discharge from ID perspective    The plan of care was discussed with the primary hospital team, Dr Corley     Recommendations were also discussed with the patient; all questions were answered.     Thank you for this consultation. Please don't hesitate to call the ID team for questions or any acute changes in patient's clinical condition.    Please note that this report has been produced using speech recognition software and may contain errors related to that system including, but not limited to, errors in grammar, punctuation, and spelling, as well as words and phrases that possibly may have been recognized inappropriately.  If there are any questions or concerns, contact the dictating provider for clarification.    The 21st Century Cures Act makes medical notes like these available to patients in the interest of transparency. Please be advised this is a medical document. Medical documents are intended to carry relevant information, facts as evident, and the clinical opinion of the practitioner. The medical note is intended as peer to peer communication and may appear blunt or direct. It is written in  medical language and may contain abbreviations or verbiage that are unfamiliar.     MD CECY Alvarado [facility]. Tel: [phone]. Fax: [phone].     Melchor Laws : 1980 MRN: [id number] CSN: [id number]

## 2024-07-29 RX ORDER — OMEPRAZOLE 40 MG/1
40 CAPSULE, DELAYED RELEASE ORAL DAILY
Qty: 90 CAPSULE | Refills: 3 | OUTPATIENT
Start: 2024-07-29

## 2024-07-29 NOTE — TELEPHONE ENCOUNTER
Requested Prescriptions     Pending Prescriptions Disp Refills    OMEPRAZOLE 40 MG Oral Capsule Delayed Release [Pharmacy Med Name: OMEPRAZOLE 40MG CAPSULES] 90 capsule 3     Sig: TAKE 1 CAPSULE(40 MG) BY MOUTH DAILY       No future appointments.    LOV: 4/12/23 Telemedicine   Last Refilled:8/3/2023 #90 3RF         Summary:  1.  Cont. enbrel 50mg every week   2. -Cont. alloupurinol 100mga day   3.  Check labs   4. Refer to  [facility] [phone] - eval for brenda's -  5. Return to clinic in 2-3 months.         - he's still taking 2-3 oxycodon in the am   - refer to GI for severe gastriits - d/w him that I did not dx him with UC - needs GI to eval     - should get a therapist   - he sees pain doctor - for [facility] for his pain meds   -      - check labs today      Mary Howard MD  11/23/2022   11:42 AM        Addendum  Called pt on 11/29 after his last bmp - showing slight hyperkalemia again  He was taking kayexlate.   Put in referral for endocrine to eval for addisons- with his hx of dehyration and hyperkalemia.      Mary Howard MD  4/12/2023   5:22 PM  - [place]ed [place]  through Epic

## 2024-12-26 ENCOUNTER — TELEPHONE (OUTPATIENT)
Dept: RHEUMATOLOGY | Facility: CLINIC | Age: 44
End: 2024-12-26

## 2024-12-26 NOTE — TELEPHONE ENCOUNTER
Talked to dr Caldwell - new pcp -   [phone]  Weaned down on his narcotics -   Has follow up in 6 weeks -   Last seen in 2023 -

## (undated) DEVICE — SOLUTION IRRIG 1000ML 0.9% NACL USP BTL

## (undated) DEVICE — SET CYSTO IRRIG L77IN DIA0.241IN BLDR NVENT

## (undated) DEVICE — SUTURE ETHLN SZ 4-0 L18IN NABSRB BLK L19MM

## (undated) DEVICE — STERILE POLYISOPRENE POWDER-FREE SURGICAL GLOVES: Brand: PROTEXIS

## (undated) DEVICE — SLEEVE COMPR M KNEE LEN SGL USE KENDALL SCD

## (undated) DEVICE — PADDING CAST W2INXL4YD ST COHESIVE LP

## (undated) DEVICE — UPPER EXTREMITY CDS-LF: Brand: MEDLINE INDUSTRIES, INC.

## (undated) DEVICE — DISPOSABLE BIPOLAR FORCEPS 4" (10.2CM) JEWELERS, STRAIGHT 0.4MM TIP AND 12 FT. (3.6M) CABLE: Brand: KIRWAN

## (undated) DEVICE — DISPOSABLE TOURNIQUET CUFF SINGLE BLADDER, DUAL PORT AND QUICK CONNECT CONNECTOR: Brand: COLOR CUFF

## (undated) DEVICE — OCCLUSIVE GAUZE STRIP OVERWRAP,3% BISMUTH TRIBROMOPHENATE IN PETROLATUM BLEND: Brand: XEROFORM

## (undated) DEVICE — BANDAGE COMPR L5YDXW2IN FOAM CO FLX

## (undated) DEVICE — SOLUTION IV 1000ML 0.9% NACL PRESERVATIVE

## (undated) DEVICE — STOCKINETTE SUR W4XL54IN OFF WHT 100%

## (undated) DEVICE — GOWN,SIRUS,FABRIC-REINFORCED,X-LARGE: Brand: MEDLINE

## (undated) NOTE — LETTER
[address]  [address]  Authorization for Surgical Operation and Procedure     Date:___________                                                                                                         Time:__________  I hereby authorize Surgeon(s):  Sushma Villavicencio MD, my physician and his/her assistants (if applicable), which may include medical students, residents, and/or fellows, to perform the following surgical operation/ procedure and administer such anesthesia as may be determined necessary by my physician:  Operation/Procedure name (s) Procedure(s):  IRRIGATION & DEBRIDEMENT RIGHT HAND on Melchor WOODALL Veto   2.   I recognize that during the surgical operation/procedure, unforeseen conditions may necessitate additional or different procedures than those listed above.  I, therefore, further authorize and request that the above-named surgeon, assistants, or designees perform such procedures as are, in their judgment, necessary and desirable.    3.   My surgeon/physician has discussed prior to my surgery the potential benefits, risks and side effects of this procedure; the likelihood of achieving goals; and potential problems that might occur during recuperation.  They also discussed reasonable alternatives to the procedure, including risks, benefits, and side effects related to the alternatives and risks related to not receiving this procedure.  I have had all my questions answered and I acknowledge that no guarantee has been made as to the result that may be obtained.    4.   Should the need arise during my operation/procedure, which includes change of level of care prior to discharge, I also consent to the administration of blood and/or blood products.  Further, I understand that despite careful testing and screening of blood or blood products by collecting agencies, I may still be subject to ill effects as a result of receiving a blood transfusion and/or blood  products.  The following are some, but not all, of the potential risks that can occur: fever and allergic reactions, hemolytic reactions, transmission of diseases such as Hepatitis, AIDS and Cytomegalovirus (CMV) and fluid overload.  In the event that I wish to have an autologous transfusion of my own blood, or a directed donor transfusion, I will discuss this with my physician.  Check only if Refusing Blood or Blood Products  I understand refusal of blood or blood products as deemed necessary by my physician may have serious consequences to my condition to include possible death. I hereby assume responsibility for my refusal and release the hospital, its personnel, and my physicians from any responsibility for the consequences of my refusal.          o  Refuse      5.   I authorize the use of any specimen, organs, tissues, body parts or foreign objects that may be removed from my body during the operation/procedure for diagnosis, research or teaching purposes and their subsequent disposal by hospital authorities.  I also authorize the release of specimen test results and/or written reports to my treating physician on the hospital medical staff or other referring or consulting physicians involved in my care, at the discretion of the Pathologist or my treating physician.    6.   I consent to the photographing or videotaping of the operations or procedures to be performed, including appropriate portions of my body for medical, scientific, or educational purposes, provided my identity is not revealed by the pictures or by descriptive texts accompanying them.  If the procedure has been photographed/videotaped, the surgeon will obtain the original picture, image, videotape or CD.  The hospital will not be responsible for storage, release or maintenance of the picture, image, tape or CD.    7.   I consent to the presence of a  or observers in the operating room as deemed necessary by my physician or  their designees.    8.   I recognize that in the event my procedure results in extended X-Ray/fluoroscopy time, I may develop a skin reaction.    9. If I have a Do Not Attempt Resuscitation (DNAR) order in place, that status will be suspended while in the operating room, procedural suite, and during the recovery period unless otherwise explicitly stated by me (or a person authorized to consent on my behalf). The surgeon or my attending physician will determine when the applicable recovery period ends for purposes of reinstating the DNAR order.  10. Patients having a sterilization procedure: I understand that if the procedure is successful the results will be permanent and it will therefore be impossible for me to inseminate, conceive, or bear children.  I also understand that the procedure is intended to result in sterility, although the result has not been guaranteed.   11. I acknowledge that my physician has explained sedation/analgesia administration to me including the risk and benefits I consent to the administration of sedation/analgesia as may be necessary or desirable in the judgment of my physician.    I CERTIFY THAT I HAVE READ AND FULLY UNDERSTAND THE ABOVE CONSENT TO OPERATION and/or OTHER PROCEDURE.    _________________________________________  __________________________________  Signature of Patient     Signature of Responsible Person         ___________________________________         Printed Name of Responsible Person           _________________________________                 Relationship to Patient  _________________________________________  ______________________________  Signature of Witness          Date  Time      Patient Name: Melchor WOODALL Veto     : 1980                 Printed: 2024     Medical Record #: [id number]                     Page [address] of [address] [place] [address]  [id number]    Consent for Anesthesia    I,  Melchor Laws agree to be cared for by an anesthesiologist, who is specially trained to monitor me and give me medicine to put me to sleep or keep me comfortable during my procedure    I understand that my anesthesiologist is not an employee or agent of [facility] [employer]. He or she works for [employer].    As the patient asking for anesthesia services, I agree to:  Allow the anesthesiologist (anesthesia doctor) to give me medicine and do additional procedures as necessary. Some examples are: Starting or using an “IV” to give me medicine, fluids or blood during my procedure, and having a breathing tube placed to help me breathe when I’m asleep (intubation). In the event that my heart stops working properly, I understand that my anesthesiologist will make every effort to sustain my life, unless otherwise directed by [facility] Do Not Resuscitate documents.  Tell my anesthesia doctor before my procedure:  If I am pregnant.  The last time that I ate or drank.  All of the medicines I take (including prescriptions, herbal supplements, and pills I can buy without a prescription (including street drugs/illegal medications). Failure to inform my anesthesiologist about these medicines may increase my risk of anesthetic complications.  If I am allergic to anything or have had a reaction to anesthesia before.  I understand how the anesthesia medicine will help me (benefits).  I understand that with any type of anesthesia medicine there are risks:  The most common risks are: nausea, vomiting, sore throat, muscle soreness, damage to my eyes, mouth, or teeth (from breathing tube placement).  Rare risks include: remembering what happened during my procedure, allergic reactions to medications, injury to my airway, heart, lungs, vision, nerves, or muscles and in extremely rare instances death.  My doctor has explained to me other choices available to me for my care  (alternatives).  Pregnant Patients (“epidural”):  I understand that the risks of having an epidural (medicine given into my back to help control pain during labor), include itching, low blood pressure, difficulty urinating, headache or slowing of the baby’s heart. Very rare risks include infection, bleeding, seizure, irregular heart rhythms and nerve injury.  Regional Anesthesia (“spinal”, “epidural”, & “nerve blocks”):  I understand that rare but potential complications include headache, bleeding, infection, seizure, irregular heart rhythms, and nerve injury.    I can change my mind about having anesthesia services at any time before I get the medicine.    _____________________________________________________________________________  Patient (or Representative) Signature/Relationship to Patient  Date   Time    _____________________________________________________________________________   Name (if used)    Language/Organization   Time    _____________________________________________________________________________  Anesthesiologist Signature     Date   Time  I have discussed the procedure and information above with the patient (or patient’s representative) and answered their questions. The patient or their representative has agreed to have anesthesia services.    _____________________________________________________________________________  Witness        Date   Time  I have verified that the signature is that of the patient or patient’s representative, and that it was signed before the procedure  Patient Name: Melchor WOODALL Veto     : 1980                 Printed: 2024     Medical Record #: [id number]                     Page 2 of 2

## (undated) NOTE — LETTER
AUTHORIZATION FOR SURGICAL OPERATION OR OTHER PROCEDURE    1.  I hereby authorize Dr. Sherwin Ferguson, and [facility] staff assigned to my case to perform the following operation and/or procedure at the [facility]:    ________________________________ Time:  ________ A. M.  P.M.        Patient Name:  ______________________________________________________  (please print)      Patient signature:  ___________________________________________________             Relationship to Patient:

## (undated) NOTE — LETTER
9/28/2022              [address]         To Whom It May Concern,     Butch Sepulveda is currently under my care. He has ankylosing spondylitis and chronic pain. His condition is currently not well controlled.      Sincerely,          Nani Mendez MD  [address] [place], [place] [address], [address]  [place] [address]  [phone]        Document electronically generated by:  Nani Mendez MD

## (undated) NOTE — LETTER
05/11/20    Maggi Vazquez  [address] [address]      Dear Joseluis Sommers,    [facility] records indicate that you have outstanding lab work and or testing that was ordered for you and has not yet been completed:  Orders Placed This Encounter